# Patient Record
Sex: FEMALE | Race: WHITE
[De-identification: names, ages, dates, MRNs, and addresses within clinical notes are randomized per-mention and may not be internally consistent; named-entity substitution may affect disease eponyms.]

---

## 2017-03-03 ENCOUNTER — PRIOR ORIGINAL RECORDS (OUTPATIENT)
Dept: OTHER | Age: 82
End: 2017-03-03

## 2017-03-03 ENCOUNTER — LAB ENCOUNTER (OUTPATIENT)
Dept: LAB | Facility: HOSPITAL | Age: 82
End: 2017-03-03
Payer: MEDICARE

## 2017-03-03 DIAGNOSIS — I78.0 HEREDITARY HEMORRHAGIC TELANGIECTASIA (HCC): ICD-10-CM

## 2017-03-03 DIAGNOSIS — I50.20 SYSTOLIC HEART FAILURE (HCC): ICD-10-CM

## 2017-03-03 DIAGNOSIS — Z79.899 ENCOUNTER FOR MONITORING DIURETIC THERAPY: ICD-10-CM

## 2017-03-03 DIAGNOSIS — E11.9 DIABETES MELLITUS (HCC): Primary | ICD-10-CM

## 2017-03-03 DIAGNOSIS — D61.818 PANCYTOPENIA (HCC): ICD-10-CM

## 2017-03-03 DIAGNOSIS — R74.8 ELEVATED LIPASE: ICD-10-CM

## 2017-03-03 DIAGNOSIS — Z51.81 ENCOUNTER FOR MONITORING DIURETIC THERAPY: ICD-10-CM

## 2017-03-03 DIAGNOSIS — I48.20 CHRONIC ATRIAL FIBRILLATION (HCC): ICD-10-CM

## 2017-03-03 LAB
ALBUMIN SERPL BCP-MCNC: 4 G/DL (ref 3.5–4.8)
ALBUMIN/GLOB SERPL: 1.4 {RATIO} (ref 1–2)
ALP SERPL-CCNC: 78 U/L (ref 32–100)
ALT SERPL-CCNC: 29 U/L (ref 14–54)
ANION GAP SERPL CALC-SCNC: 6 MMOL/L (ref 0–18)
AST SERPL-CCNC: 37 U/L (ref 15–41)
BASOPHILS # BLD: 0 K/UL (ref 0–0.2)
BASOPHILS NFR BLD: 0 %
BILIRUB SERPL-MCNC: 1 MG/DL (ref 0.3–1.2)
BUN SERPL-MCNC: 29 MG/DL (ref 8–20)
BUN/CREAT SERPL: 35.8 (ref 10–20)
CALCIUM SERPL-MCNC: 9.3 MG/DL (ref 8.5–10.5)
CHLORIDE SERPL-SCNC: 106 MMOL/L (ref 95–110)
CK SERPL-CCNC: 78 U/L (ref 38–234)
CO2 SERPL-SCNC: 30 MMOL/L (ref 22–32)
CREAT SERPL-MCNC: 0.81 MG/DL (ref 0.5–1.5)
EOSINOPHIL # BLD: 0 K/UL (ref 0–0.7)
EOSINOPHIL NFR BLD: 2 %
ERYTHROCYTE [DISTWIDTH] IN BLOOD BY AUTOMATED COUNT: 13.3 % (ref 11–15)
GLOBULIN PLAS-MCNC: 2.8 G/DL (ref 2.5–3.7)
GLUCOSE SERPL-MCNC: 100 MG/DL (ref 70–99)
HCT VFR BLD AUTO: 36.4 % (ref 35–48)
HGB BLD-MCNC: 11.9 G/DL (ref 12–16)
LIPASE SERPL-CCNC: 54 U/L (ref 22–51)
LYMPHOCYTES # BLD: 0.5 K/UL (ref 1–4)
LYMPHOCYTES NFR BLD: 17 %
MAGNESIUM SERPL-MCNC: 2 MG/DL (ref 1.8–2.5)
MCH RBC QN AUTO: 31.9 PG (ref 27–32)
MCHC RBC AUTO-ENTMCNC: 32.6 G/DL (ref 32–37)
MCV RBC AUTO: 97.8 FL (ref 80–100)
MONOCYTES # BLD: 0.2 K/UL (ref 0–1)
MONOCYTES NFR BLD: 8 %
NEUTROPHILS # BLD AUTO: 2.1 K/UL (ref 1.8–7.7)
NEUTROPHILS NFR BLD: 73 %
OSMOLALITY UR CALC.SUM OF ELEC: 300 MOSM/KG (ref 275–295)
PLATELET # BLD AUTO: 108 K/UL (ref 140–400)
PMV BLD AUTO: 9.2 FL (ref 7.4–10.3)
POTASSIUM SERPL-SCNC: 4.3 MMOL/L (ref 3.3–5.1)
PROT SERPL-MCNC: 6.8 G/DL (ref 5.9–8.4)
RBC # BLD AUTO: 3.72 M/UL (ref 3.7–5.4)
SODIUM SERPL-SCNC: 142 MMOL/L (ref 136–144)
WBC # BLD AUTO: 2.9 K/UL (ref 4–11)

## 2017-03-03 PROCEDURE — 82550 ASSAY OF CK (CPK): CPT

## 2017-03-03 PROCEDURE — 83690 ASSAY OF LIPASE: CPT

## 2017-03-03 PROCEDURE — 85025 COMPLETE CBC W/AUTO DIFF WBC: CPT

## 2017-03-03 PROCEDURE — 80053 COMPREHEN METABOLIC PANEL: CPT

## 2017-03-03 PROCEDURE — 83735 ASSAY OF MAGNESIUM: CPT

## 2017-03-03 PROCEDURE — 36415 COLL VENOUS BLD VENIPUNCTURE: CPT

## 2017-03-06 LAB
ALBUMIN: 4 G/DL
ALKALINE PHOSPHATATE(ALK PHOS): 78 IU/L
ALT (SGPT): 29 U/L
AST (SGOT): 37 U/L
BILIRUBIN TOTAL: 1 MG/DL
BUN: 29 MG/DL
CALCIUM: 9.3 MG/DL
CHLORIDE: 106 MEQ/L
CREATININE KINASE: 78 U/L
CREATININE, SERUM: 0.81 MG/DL
GLOBULIN: 2.8 G/DL
GLUCOSE: 100 MG/DL
GLUCOSE: 100 MG/DL
MAGNESIUM: 2 MG/DL
POTASSIUM, SERUM: 4.3 MEQ/L
PROTEIN, TOTAL: 6.8 G/DL
SGOT (AST): 37 IU/L
SGPT (ALT): 29 IU/L
SODIUM: 142 MEQ/L

## 2017-03-16 ENCOUNTER — OFFICE VISIT (OUTPATIENT)
Dept: INTERNAL MEDICINE CLINIC | Facility: CLINIC | Age: 82
End: 2017-03-16

## 2017-03-16 VITALS
WEIGHT: 112 LBS | DIASTOLIC BLOOD PRESSURE: 70 MMHG | HEART RATE: 81 BPM | BODY MASS INDEX: 19.84 KG/M2 | SYSTOLIC BLOOD PRESSURE: 124 MMHG | HEIGHT: 63 IN | RESPIRATION RATE: 17 BRPM | OXYGEN SATURATION: 98 %

## 2017-03-16 DIAGNOSIS — I65.29 STENOSIS OF CAROTID ARTERY, UNSPECIFIED LATERALITY: ICD-10-CM

## 2017-03-16 DIAGNOSIS — N18.9 CHRONIC RENAL INSUFFICIENCY, UNSPECIFIED STAGE: ICD-10-CM

## 2017-03-16 DIAGNOSIS — I34.1 MITRAL VALVE PROLAPSE: ICD-10-CM

## 2017-03-16 DIAGNOSIS — G62.9 NEUROPATHY: ICD-10-CM

## 2017-03-16 DIAGNOSIS — R26.89 MULTIFACTORIAL GAIT DISORDER: ICD-10-CM

## 2017-03-16 DIAGNOSIS — I50.32 CHRONIC DIASTOLIC CONGESTIVE HEART FAILURE, NYHA CLASS 2 (HCC): Primary | ICD-10-CM

## 2017-03-16 DIAGNOSIS — I83.893 VARICOSE VEINS OF BOTH LEGS WITH EDEMA: ICD-10-CM

## 2017-03-16 DIAGNOSIS — D70.9 CHRONIC NEUTROPENIA (HCC): ICD-10-CM

## 2017-03-16 DIAGNOSIS — R41.3 MEMORY DISORDER: ICD-10-CM

## 2017-03-16 DIAGNOSIS — M48.02 CERVICAL SPINAL STENOSIS: ICD-10-CM

## 2017-03-16 DIAGNOSIS — E78.00 HIGH CHOLESTEROL: ICD-10-CM

## 2017-03-16 DIAGNOSIS — M05.79 RHEUMATOID ARTHRITIS INVOLVING MULTIPLE SITES WITH POSITIVE RHEUMATOID FACTOR (HCC): ICD-10-CM

## 2017-03-16 DIAGNOSIS — I48.20 CHRONIC ATRIAL FIBRILLATION (HCC): ICD-10-CM

## 2017-03-16 DIAGNOSIS — M54.16 LUMBAR RADICULOPATHY: ICD-10-CM

## 2017-03-16 LAB
TSH SERPL-ACNC: 1.35 UIU/ML (ref 0.34–5.6)
VIT B12 SERPL-MCNC: 397 PG/ML (ref 181–914)

## 2017-03-16 PROCEDURE — 84443 ASSAY THYROID STIM HORMONE: CPT | Performed by: INTERNAL MEDICINE

## 2017-03-16 PROCEDURE — 99214 OFFICE O/P EST MOD 30 MIN: CPT | Performed by: INTERNAL MEDICINE

## 2017-03-16 PROCEDURE — 82607 VITAMIN B-12: CPT | Performed by: INTERNAL MEDICINE

## 2017-03-16 RX ORDER — SPIRONOLACTONE 25 MG/1
12.5 TABLET ORAL
Qty: 26 TABLET | Refills: 0 | Status: SHIPPED | OUTPATIENT
Start: 2017-03-16 | End: 2017-09-11

## 2017-03-16 NOTE — PROGRESS NOTES
Kenji Pimentel is a 80year old female. Patient presents with:  Test Results: Pt presents to clinic to discuss recent labs. Medication Follow-Up: Would also like to discuss and  go over meds.        HPI:       Patient comes to office accompanied by brooks OP) Place 1 drop into both eyes 2 (two) times daily. Disp:  Rfl:    Vitamin B-12 (VITAMIN B12) 1000 MCG Oral Tab Take 1,000 mcg by mouth daily. Disp:  Rfl:    Multiple Vitamins-Minerals (MULTIVITAL) Oral Tab Take 1 tablet by mouth daily.  Disp:  Rfl:    asp Lasix  MUSCULOSKELETAL: Back pain radiating to right leg  SKIN: Itchiness of skin improved  NEUROLOGICAL: denies focal deficits  HEME: Planes of ecchymoses upper extremities  PSYCH: anxiety    Physical Exam:   03/16/17  1238   BP: 124/70   Pulse: 81   Resp 12.0-16.0 g/dL   HCT 36.4 35.0-48.0 %   MCV 97.8 80.0-100.0 fL   MCH 31.9 27.0-32.0 pg   MCHC 32.6 32.0-37.0 g/dl   RDW 13.3 11.0-15.0 %    (L) 140-400 K/UL   MPV 9.2 7.4-10.3 fL   Neutrophil % 73 %   Lymphocyte % 17 %   Monocyte % 8 %   Eosinophil stenosis  Plan: This possibly affects her gait also    (I65.29) Stenosis of carotid artery, unspecified laterality  Plan: Followed by cardiology    (D70.9) Chronic neutropenia ([facility] [address]  Plan: WBC count slightly decreased from prior. To follow in the future.

## 2017-04-12 ENCOUNTER — MYAURORA ACCOUNT LINK (OUTPATIENT)
Dept: OTHER | Age: 82
End: 2017-04-12

## 2017-04-12 ENCOUNTER — PRIOR ORIGINAL RECORDS (OUTPATIENT)
Dept: OTHER | Age: 82
End: 2017-04-12

## 2017-04-12 LAB
HEMATOCRIT: 36.4 %
HEMOGLOBIN: 11.9 G/DL
PLATELETS: 108 K/UL
RED BLOOD COUNT: 3.72 X 10-6/U
WHITE BLOOD COUNT: 2.9 X 10-3/U

## 2017-04-25 ENCOUNTER — OFFICE VISIT (OUTPATIENT)
Dept: FAMILY MEDICINE CLINIC | Facility: CLINIC | Age: 82
End: 2017-04-25

## 2017-04-25 VITALS
WEIGHT: 110 LBS | SYSTOLIC BLOOD PRESSURE: 118 MMHG | DIASTOLIC BLOOD PRESSURE: 78 MMHG | HEART RATE: 83 BPM | BODY MASS INDEX: 20.24 KG/M2 | OXYGEN SATURATION: 97 % | RESPIRATION RATE: 20 BRPM | HEIGHT: 62 IN | TEMPERATURE: 98 F

## 2017-04-25 DIAGNOSIS — J40 BRONCHITIS: Primary | ICD-10-CM

## 2017-04-25 PROCEDURE — 99213 OFFICE O/P EST LOW 20 MIN: CPT | Performed by: NURSE PRACTITIONER

## 2017-04-25 RX ORDER — ALBUTEROL SULFATE 90 UG/1
AEROSOL, METERED RESPIRATORY (INHALATION)
Qty: 1 INHALER | Refills: 0 | Status: SHIPPED | OUTPATIENT
Start: 2017-04-25 | End: 2018-09-26 | Stop reason: ALTCHOICE

## 2017-04-25 NOTE — PROGRESS NOTES
CHIEF COMPLAINT:   Patient presents with:  Nasal Congestion  Cough        HPI:   Fernando Bazzi is a 80year old female who presents for cough for  2  days. Cough started gradually and is described as dry and hacking. Patient no history of bronchitis. daily. Disp:  Rfl:       Past Medical History   Diagnosis Date   • Osteoarthrosis, unspecified whether generalized or localized, unspecified site    • Atrial fibrillation ([facility]    • CHF (congestive heart failure) ([facility]    • Depression    • Anxiety state, u female who presents with: Cough    ASSESSMENT:  Bronchitis  (primary encounter diagnosis)    PLAN:  Meds as below. Comfort Care as listed in Patient Instructions.       Meds & Refills for this Visit:  Signed Prescriptions Disp Refills    Albuterol Sulfate test for bronchitis? — People do not usually need a test. But your doctor or nurse might do a test, such as a chest x-ray, if the cause of your cough isn’t clear.    How is bronchitis treated? — Doctors do not usually treat bronchitis with antibiotic medici

## 2017-04-25 NOTE — PATIENT INSTRUCTIONS
What is bronchitis? — Bronchitis is an infection that causes a cough. It happens when the tubes that carry air into the lungs, called the “bronchi,” get infected. Usually, bronchitis happens when a person gets a cold or the flu.  The viruses that cause th pain-relieving medicine   Taking over-the-counter cough and cold medicines   Breathing in warm, moist air, such as in the shower, over a kettle, or from a humidifier  How can I keep from getting bronchitis again? — You can reduce your chance of getting bro

## 2017-05-20 ENCOUNTER — OFFICE VISIT (OUTPATIENT)
Dept: INTERNAL MEDICINE CLINIC | Facility: CLINIC | Age: 82
End: 2017-05-20

## 2017-05-20 VITALS
RESPIRATION RATE: 14 BRPM | WEIGHT: 114 LBS | BODY MASS INDEX: 20.2 KG/M2 | HEART RATE: 74 BPM | DIASTOLIC BLOOD PRESSURE: 68 MMHG | SYSTOLIC BLOOD PRESSURE: 120 MMHG | HEIGHT: 63 IN | TEMPERATURE: 98 F | OXYGEN SATURATION: 98 %
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
## 2017-05-20 DIAGNOSIS — R41.3 MEMORY DISORDER: ICD-10-CM

## 2017-05-20 DIAGNOSIS — S51.809A: Primary | ICD-10-CM

## 2017-05-20 DIAGNOSIS — I48.20 CHRONIC ATRIAL FIBRILLATION (HCC): ICD-10-CM

## 2017-05-20 PROCEDURE — 90714 TD VACC NO PRESV 7 YRS+ IM: CPT | Performed by: INTERNAL MEDICINE

## 2017-05-20 PROCEDURE — 99214 OFFICE O/P EST MOD 30 MIN: CPT | Performed by: INTERNAL MEDICINE

## 2017-05-20 PROCEDURE — 90471 IMMUNIZATION ADMIN: CPT | Performed by: INTERNAL MEDICINE

## 2017-05-20 RX ORDER — DONEPEZIL HYDROCHLORIDE 5 MG/1
5 TABLET, FILM COATED ORAL EVERY OTHER DAY
Qty: 30 TABLET | Refills: 0 | Status: SHIPPED | OUTPATIENT
Start: 2017-05-20 | End: 2017-05-30

## 2017-05-20 RX ORDER — CEPHALEXIN 250 MG/5ML
250 POWDER, FOR SUSPENSION ORAL 3 TIMES DAILY
Qty: 150 ML | Refills: 0 | Status: SHIPPED | OUTPATIENT
Start: 2017-05-20 | End: 2017-05-20 | Stop reason: CLARIF

## 2017-05-20 RX ORDER — CEPHALEXIN 250 MG/1
250 CAPSULE ORAL 3 TIMES DAILY
Qty: 15 CAPSULE | Refills: 0 | Status: SHIPPED | OUTPATIENT
Start: 2017-05-20 | End: 2017-10-21 | Stop reason: ALTCHOICE

## 2017-05-20 NOTE — PROGRESS NOTES
Hoda Bazzi is a 80year old female. Patient presents with:   Follow - Up: pt presents to clinic for follow up  Medication Request: request refills       HPI:         Rosalio Betters outside with cane instead of walker 13 days ago; tripped, fell on left side mouth daily. Disp:  Rfl:    Multiple Vitamins-Minerals (MULTIVITAL) Oral Tab Take 1 tablet by mouth daily. Disp:  Rfl:    aspirin 81 MG Oral Tab Take 81 mg by mouth daily.  Disp:  Rfl:    Metoprolol Succinate ER (TOPROL XL) 25 MG Oral Tablet 24 Hr Take 25 m bruising or bleeding  PSYCH: denies depression     Physical Exam:   05/20/17  1128   BP: 120/68   Pulse: 74   Temp: 97.5 °F (36.4 °C)   TempSrc: Oral   Height: 63\"   Weight: 114 lb   SpO2: 98%     Body mass index is 20.2 kg/(m^2).   Patient is alert, Berniece Nay encounter  Plan: Patient had PT in past and did not use walker when going outside of house, tripped over sidewalk, not paying attention with where walking.  Advised to go out or take shower when caretaker present.    (R41.3) Memory disorder  Plan: Donepezil

## 2017-05-20 NOTE — PROGRESS NOTES
TD injection/vaccine  0.5 mL administered IM  to the LT Deltoid. Vaccine/injection ordered by physician and documented within chart. Per physician able to administer vaccine/injection. Pt tolerated well. VIS provided and pt had no further questions.   ve

## 2017-05-30 DIAGNOSIS — R41.3 MEMORY DISORDER: Primary | ICD-10-CM

## 2017-05-30 RX ORDER — DONEPEZIL HYDROCHLORIDE 5 MG/1
5 TABLET, FILM COATED ORAL EVERY OTHER DAY
Qty: 30 TABLET | Refills: 0 | OUTPATIENT
Start: 2017-05-30 | End: 2017-07-27

## 2017-06-21 DIAGNOSIS — M54.17 L-S RADICULOPATHY: Primary | ICD-10-CM

## 2017-06-21 RX ORDER — IPRATROPIUM BROMIDE 21 UG/1
2 SPRAY, METERED NASAL 2 TIMES DAILY
Qty: 1 BOTTLE | Refills: 1 | Status: ON HOLD | OUTPATIENT
Start: 2017-06-21 | End: 2018-09-26

## 2017-07-27 DIAGNOSIS — R41.3 MEMORY DISORDER: ICD-10-CM

## 2017-07-29 RX ORDER — DONEPEZIL HYDROCHLORIDE 5 MG/1
TABLET, FILM COATED ORAL
Qty: 30 TABLET | Refills: 0 | Status: SHIPPED | OUTPATIENT
Start: 2017-07-29 | End: 2017-08-29

## 2017-08-29 DIAGNOSIS — R41.3 MEMORY DISORDER: ICD-10-CM

## 2017-08-30 RX ORDER — DONEPEZIL HYDROCHLORIDE 5 MG/1
TABLET, FILM COATED ORAL
Qty: 30 TABLET | Refills: 0 | Status: SHIPPED | OUTPATIENT
Start: 2017-08-30 | End: 2017-10-05

## 2017-08-31 ENCOUNTER — TELEPHONE (OUTPATIENT)
Dept: INTERNAL MEDICINE CLINIC | Facility: CLINIC | Age: 82
End: 2017-08-31

## 2017-09-11 RX ORDER — SPIRONOLACTONE 25 MG/1
12.5 TABLET ORAL
Qty: 26 TABLET | Refills: 0 | Status: SHIPPED | OUTPATIENT
Start: 2017-09-11 | End: 2017-11-03

## 2017-09-11 NOTE — TELEPHONE ENCOUNTER
LOV 5/20/2017, Patient was instructed to follow up with Dr Ezequiel Morataya 5 days after this appointment.  Patient is due for Medicare visits, Please advise

## 2017-10-05 DIAGNOSIS — R41.3 MEMORY DISORDER: ICD-10-CM

## 2017-10-06 RX ORDER — DONEPEZIL HYDROCHLORIDE 5 MG/1
TABLET, FILM COATED ORAL
Qty: 30 TABLET | Refills: 0 | Status: SHIPPED | OUTPATIENT
Start: 2017-10-06 | End: 2017-11-08

## 2017-10-21 ENCOUNTER — LAB ENCOUNTER (OUTPATIENT)
Dept: LAB | Facility: HOSPITAL | Age: 82
End: 2017-10-21
Attending: INTERNAL MEDICINE
Payer: MEDICARE

## 2017-10-21 ENCOUNTER — OFFICE VISIT (OUTPATIENT)
Dept: INTERNAL MEDICINE CLINIC | Facility: CLINIC | Age: 82
End: 2017-10-21

## 2017-10-21 VITALS
HEART RATE: 81 BPM | TEMPERATURE: 97 F | BODY MASS INDEX: 23.68 KG/M2 | WEIGHT: 132 LBS | OXYGEN SATURATION: 98 % | HEIGHT: 62.5 IN | SYSTOLIC BLOOD PRESSURE: 130 MMHG | DIASTOLIC BLOOD PRESSURE: 60 MMHG

## 2017-10-21 DIAGNOSIS — R41.3 MEMORY DISORDER: ICD-10-CM

## 2017-10-21 DIAGNOSIS — I48.20 CHRONIC ATRIAL FIBRILLATION (HCC): ICD-10-CM

## 2017-10-21 DIAGNOSIS — Z12.39 BREAST CANCER SCREENING: ICD-10-CM

## 2017-10-21 DIAGNOSIS — T50.2X5S DIURETICS CAUSING ADVERSE EFFECT IN THERAPEUTIC USE, SEQUELA: ICD-10-CM

## 2017-10-21 DIAGNOSIS — I10 ESSENTIAL HYPERTENSION: ICD-10-CM

## 2017-10-21 DIAGNOSIS — M72.2 PLANTAR FASCIITIS, RIGHT: ICD-10-CM

## 2017-10-21 DIAGNOSIS — R06.00 DYSPNEA ON EXERTION: ICD-10-CM

## 2017-10-21 DIAGNOSIS — I50.31 ACUTE DIASTOLIC CONGESTIVE HEART FAILURE (HCC): Primary | ICD-10-CM

## 2017-10-21 DIAGNOSIS — D50.0 IRON DEFICIENCY ANEMIA DUE TO CHRONIC BLOOD LOSS: ICD-10-CM

## 2017-10-21 DIAGNOSIS — R92.2 DENSE BREAST TISSUE: ICD-10-CM

## 2017-10-21 DIAGNOSIS — I50.31 ACUTE DIASTOLIC CONGESTIVE HEART FAILURE (HCC): ICD-10-CM

## 2017-10-21 DIAGNOSIS — R19.7 DIARRHEA OF PRESUMED INFECTIOUS ORIGIN: ICD-10-CM

## 2017-10-21 PROCEDURE — 83540 ASSAY OF IRON: CPT

## 2017-10-21 PROCEDURE — 36415 COLL VENOUS BLD VENIPUNCTURE: CPT

## 2017-10-21 PROCEDURE — 80061 LIPID PANEL: CPT

## 2017-10-21 PROCEDURE — 85025 COMPLETE CBC W/AUTO DIFF WBC: CPT

## 2017-10-21 PROCEDURE — 83880 ASSAY OF NATRIURETIC PEPTIDE: CPT

## 2017-10-21 PROCEDURE — 99213 OFFICE O/P EST LOW 20 MIN: CPT | Performed by: INTERNAL MEDICINE

## 2017-10-21 PROCEDURE — 80053 COMPREHEN METABOLIC PANEL: CPT

## 2017-10-21 PROCEDURE — 84466 ASSAY OF TRANSFERRIN: CPT

## 2017-10-21 PROCEDURE — G0439 PPPS, SUBSEQ VISIT: HCPCS | Performed by: INTERNAL MEDICINE

## 2017-10-21 PROCEDURE — 81015 MICROSCOPIC EXAM OF URINE: CPT

## 2017-10-21 PROCEDURE — 83735 ASSAY OF MAGNESIUM: CPT

## 2017-10-21 PROCEDURE — 84443 ASSAY THYROID STIM HORMONE: CPT

## 2017-10-21 NOTE — PROGRESS NOTES
HPI:   Harry Larson is a 80year old female who presents for a Medicare Annual Wellness visit.     Patient Active Problem List:     CHF (congestive heart failure) (HCC)     Neuropathy     A-fib (HCC)     Depression     Anemia     Osteoarthritis     A Yes    Difficulty walking?: Yes    Difficulty dressing or bathing?: No    Problems with daily activities? : No    Memory Problems?: Yes      Fall/Risk Assessment          Have you fallen in the last 12 months?: 0-No                                        F MIST) 0.65 % Nasal Solution 1 spray by Nasal route 3 (three) times daily. Disp:  Rfl:    Multiple Vitamins-Minerals (MULTIVITAL) Oral Tab Take 1 tablet by mouth daily. Disp:  Rfl:    aspirin 81 MG Oral Tab Take 81 mg by mouth daily.  Disp:  Rfl:    Metoprol denies headaches  PSYCHE: denies depression or anxiety  HEMATOLOGIC: denies hx of anemia  ENDOCRINE: denies thyroid history  ALL/ASTHMA: denies hx of allergy or asthma    EXAM:   /60 (BP Location: Right arm, Patient Position: Sitting, Cuff Size: adul encounter diagnosis)  Plan: LIPID PANEL, CBC WITH DIFFERENTIAL WITH         PLATELET, COMP METABOLIC PANEL (14)        Increase diuretics as instructed.   Follow-up approximately 10 days    (R06.09) Dyspnea on exertion  Plan: BNP (B TYPE NATRIUERTIC PEPTIDE for this patient. Update Health Maintenance if applicable    Flex Sigmoidoscopy Screen every 10 years No results found for this or any previous visit. No flowsheet data found.      Fecal Occult Blood Annually No results found for: FOB No flowsheet data foun 04/04/2015 41 (H)    No flowsheet data found. Drug Serum Conc  Annually No results found for: DIGOXIN, DIG, VALP No flowsheet data found. Diabetes      HgbA1C  Annually No results found for: A1C No flowsheet data found.     Creat/alb ratio  Annuall

## 2017-10-21 NOTE — PATIENT INSTRUCTIONS
Take 2 furosemide daily times 2 days.     Then take 2 Lasix  Monday, Wed., Friday and Sundays, with 1 Lasix other days  Take 1/2 spironolactone every evening

## 2017-10-27 ENCOUNTER — APPOINTMENT (OUTPATIENT)
Dept: LAB | Facility: HOSPITAL | Age: 82
End: 2017-10-27
Attending: INTERNAL MEDICINE
Payer: MEDICARE

## 2017-10-27 PROCEDURE — 87507 IADNA-DNA/RNA PROBE TQ 12-25: CPT | Performed by: INTERNAL MEDICINE

## 2017-11-02 ENCOUNTER — OFFICE VISIT (OUTPATIENT)
Dept: INTERNAL MEDICINE CLINIC | Facility: CLINIC | Age: 82
End: 2017-11-02

## 2017-11-02 VITALS
OXYGEN SATURATION: 98 % | WEIGHT: 120 LBS | SYSTOLIC BLOOD PRESSURE: 130 MMHG | BODY MASS INDEX: 22 KG/M2 | HEART RATE: 79 BPM | DIASTOLIC BLOOD PRESSURE: 80 MMHG | TEMPERATURE: 98 F

## 2017-11-02 DIAGNOSIS — M05.79 RHEUMATOID ARTHRITIS INVOLVING MULTIPLE SITES WITH POSITIVE RHEUMATOID FACTOR (HCC): ICD-10-CM

## 2017-11-02 DIAGNOSIS — K52.9 CHRONIC DIARRHEA: ICD-10-CM

## 2017-11-02 DIAGNOSIS — M48.02 SPINAL STENOSIS IN CERVICAL REGION: ICD-10-CM

## 2017-11-02 DIAGNOSIS — I48.0 PAROXYSMAL ATRIAL FIBRILLATION (HCC): ICD-10-CM

## 2017-11-02 DIAGNOSIS — D50.0 IRON DEFICIENCY ANEMIA DUE TO CHRONIC BLOOD LOSS: ICD-10-CM

## 2017-11-02 DIAGNOSIS — I50.32 CHRONIC DIASTOLIC CONGESTIVE HEART FAILURE (HCC): Primary | ICD-10-CM

## 2017-11-02 PROCEDURE — 80048 BASIC METABOLIC PNL TOTAL CA: CPT | Performed by: INTERNAL MEDICINE

## 2017-11-02 PROCEDURE — 99213 OFFICE O/P EST LOW 20 MIN: CPT | Performed by: INTERNAL MEDICINE

## 2017-11-02 PROCEDURE — 36415 COLL VENOUS BLD VENIPUNCTURE: CPT | Performed by: INTERNAL MEDICINE

## 2017-11-02 PROCEDURE — 83735 ASSAY OF MAGNESIUM: CPT | Performed by: INTERNAL MEDICINE

## 2017-11-02 NOTE — PROGRESS NOTES
Pt presented to clinic today for blood draw. Per physician able to draw orders. Orders  documented within chart. Pt tolerated lab draw well.  verified.   Orders drawn include: marty, mg  Site of draw: rt she Sullivan CMA

## 2017-11-02 NOTE — PATIENT INSTRUCTIONS
Decrease Lasix to 2 on Mon., Wed., and Saturday and 1 Lasix other days     Decrease spironolactone to 1/2  four nights weekly ( Tues. , Thurs. , Friday and Sundays)

## 2017-11-02 NOTE — PROGRESS NOTES
Hoda Bazzi is a 80year old female. Patient presents with: Follow - Up: pt brought to clinic by son in for 1 week follow up on leg swelling.        HPI:         Since son states she was short of breath on walking at last visit, and no shortness o RA   • Atrial fibrillation (HCC)    • CHF (congestive heart failure) (HCC)    • Congestive heart disease (HCC)    • Depression    • Esophageal reflux    • Essential hypertension    • High blood pressure    • High cholesterol    • Hyperlipidemia    • Lum palpation. No masses. Extremities-edema much improved, though still trace to 1+ pitting edema in bilateral lower extremities even to thighs.   Patient has support hose on lower extremities    Objectives:    Results for orders placed or performed in visit Mon., Wed., and Saturday and 1 Lasix other days     Decrease spironolactone to 1/2  four nights weekly ( Tues. , Thurs. , Friday and Sundays)      Return in about 1 month (around 12/2/2017).         Shelley Arzate MD

## 2017-11-03 DIAGNOSIS — I10 ESSENTIAL HYPERTENSION: ICD-10-CM

## 2017-11-03 RX ORDER — SPIRONOLACTONE 25 MG/1
TABLET ORAL
Qty: 26 TABLET | Refills: 0 | Status: ON HOLD | OUTPATIENT
Start: 2017-11-03 | End: 2018-09-29

## 2017-11-03 RX ORDER — FUROSEMIDE 20 MG/1
TABLET ORAL
Qty: 60 TABLET | Refills: 1 | Status: SHIPPED | OUTPATIENT
Start: 2017-11-03 | End: 2018-01-22

## 2017-11-04 DIAGNOSIS — I10 ESSENTIAL HYPERTENSION: ICD-10-CM

## 2017-11-08 DIAGNOSIS — R41.3 MEMORY DISORDER: ICD-10-CM

## 2017-11-10 RX ORDER — DONEPEZIL HYDROCHLORIDE 5 MG/1
TABLET, FILM COATED ORAL
Qty: 90 TABLET | Refills: 1 | Status: SHIPPED | OUTPATIENT
Start: 2017-11-10 | End: 2018-04-29

## 2018-01-01 ENCOUNTER — OFFICE VISIT (OUTPATIENT)
Dept: INTERNAL MEDICINE CLINIC | Facility: CLINIC | Age: 83
End: 2018-01-01
Payer: MEDICARE

## 2018-01-01 ENCOUNTER — TELEPHONE (OUTPATIENT)
Dept: INTERNAL MEDICINE CLINIC | Facility: CLINIC | Age: 83
End: 2018-01-01

## 2018-01-01 VITALS
HEIGHT: 61.5 IN | DIASTOLIC BLOOD PRESSURE: 58 MMHG | RESPIRATION RATE: 15 BRPM | WEIGHT: 110 LBS | TEMPERATURE: 97 F | OXYGEN SATURATION: 98 % | HEART RATE: 82 BPM | SYSTOLIC BLOOD PRESSURE: 110 MMHG | BODY MASS INDEX: 20.5 KG/M2

## 2018-01-01 DIAGNOSIS — M05.79 RHEUMATOID ARTHRITIS INVOLVING MULTIPLE SITES WITH POSITIVE RHEUMATOID FACTOR (HCC): ICD-10-CM

## 2018-01-01 DIAGNOSIS — I10 ESSENTIAL HYPERTENSION: ICD-10-CM

## 2018-01-01 DIAGNOSIS — Z79.899 ENCOUNTER FOR MONITORING DIURETIC THERAPY: ICD-10-CM

## 2018-01-01 DIAGNOSIS — Z51.81 ENCOUNTER FOR MONITORING DIURETIC THERAPY: ICD-10-CM

## 2018-01-01 DIAGNOSIS — I50.32 CHRONIC DIASTOLIC CONGESTIVE HEART FAILURE (HCC): Primary | ICD-10-CM

## 2018-01-01 DIAGNOSIS — I48.0 PAROXYSMAL ATRIAL FIBRILLATION (HCC): ICD-10-CM

## 2018-01-01 DIAGNOSIS — F02.80 DEMENTIA DUE TO ALZHEIMER'S DISEASE (HCC): ICD-10-CM

## 2018-01-01 DIAGNOSIS — G30.9 DEMENTIA DUE TO ALZHEIMER'S DISEASE (HCC): ICD-10-CM

## 2018-01-01 PROCEDURE — 80048 BASIC METABOLIC PNL TOTAL CA: CPT | Performed by: INTERNAL MEDICINE

## 2018-01-01 PROCEDURE — 99213 OFFICE O/P EST LOW 20 MIN: CPT | Performed by: INTERNAL MEDICINE

## 2018-01-01 PROCEDURE — 83735 ASSAY OF MAGNESIUM: CPT | Performed by: INTERNAL MEDICINE

## 2018-01-16 ENCOUNTER — OFFICE VISIT (OUTPATIENT)
Dept: GASTROENTEROLOGY | Facility: CLINIC | Age: 83
End: 2018-01-16

## 2018-01-16 VITALS
HEIGHT: 63 IN | BODY MASS INDEX: 20.02 KG/M2 | SYSTOLIC BLOOD PRESSURE: 135 MMHG | HEART RATE: 61 BPM | WEIGHT: 113 LBS | DIASTOLIC BLOOD PRESSURE: 76 MMHG

## 2018-01-16 DIAGNOSIS — R13.10 DYSPHAGIA, UNSPECIFIED TYPE: ICD-10-CM

## 2018-01-16 DIAGNOSIS — R19.7 DIARRHEA, UNSPECIFIED TYPE: Primary | ICD-10-CM

## 2018-01-16 PROCEDURE — G0463 HOSPITAL OUTPT CLINIC VISIT: HCPCS | Performed by: INTERNAL MEDICINE

## 2018-01-16 PROCEDURE — 99203 OFFICE O/P NEW LOW 30 MIN: CPT | Performed by: INTERNAL MEDICINE

## 2018-01-16 RX ORDER — MONTELUKAST SODIUM 4 MG/1
1 TABLET, CHEWABLE ORAL NIGHTLY
Qty: 90 TABLET | Refills: 0 | Status: SHIPPED | OUTPATIENT
Start: 2018-01-16 | End: 2018-04-27

## 2018-01-16 RX ORDER — MONTELUKAST SODIUM 4 MG/1
1 TABLET, CHEWABLE ORAL NIGHTLY
Qty: 90 TABLET | Refills: 0 | Status: SHIPPED | OUTPATIENT
Start: 2018-01-16 | End: 2018-01-16

## 2018-01-16 NOTE — H&P
[facility], [facility] - Gastroenterology                                                                                                  Clinic History and Physical radiculopathy    • Osteoarthrosis, unspecified whether generalized or localized, unspecified site    • Paroxysmal atrial fibrillation [facility])    • Radiculopathy    • Spinal stenosis    • Spinal stenosis in cervical region       Past Surgical History:  No anders daily. Disp:  Rfl:      Allergies:  Not on File    ROS:   all 10 systems were reviewed and were negative except as noted in the HPI    PHYSICAL EXAM:   Blood pressure 135/76, pulse 61, height 5' 3\" (1.6 m), weight 113 lb (51.3 kg).     Xiomy Aponte insufficiency. Her son also asks about dysphagia, which sounds more oropharyngeal in origin and could be related to her cognitive impairment.  Discussed again endoscopic evaluation including the potential for malignancy though less likely though they wou

## 2018-01-16 NOTE — PATIENT INSTRUCTIONS
1. Start colestid/colestipol one pill each night - take it 2-3 hours away from other pills    2. Let me know how the diarrhea is after taking this in 2-3 weeks    3.  Get your stool test completed to look for pancreatic insufficiency as a cause for the diar

## 2018-01-22 ENCOUNTER — LAB ENCOUNTER (OUTPATIENT)
Dept: LAB | Facility: HOSPITAL | Age: 83
End: 2018-01-22
Attending: INTERNAL MEDICINE
Payer: MEDICARE

## 2018-01-22 DIAGNOSIS — R19.7 DIARRHEA: Primary | ICD-10-CM

## 2018-01-22 DIAGNOSIS — I10 ESSENTIAL HYPERTENSION: ICD-10-CM

## 2018-01-22 PROCEDURE — 82656 EL-1 FECAL QUAL/SEMIQ: CPT

## 2018-01-22 RX ORDER — FUROSEMIDE 20 MG/1
TABLET ORAL
Qty: 60 TABLET | Refills: 1 | Status: SHIPPED | OUTPATIENT
Start: 2018-01-22 | End: 2018-04-16

## 2018-01-25 ENCOUNTER — TELEPHONE (OUTPATIENT)
Dept: GASTROENTEROLOGY | Facility: CLINIC | Age: 83
End: 2018-01-25

## 2018-01-25 LAB — PANCREATIC ELASTASE , FECAL: 498 UG/G

## 2018-01-30 RX ORDER — SPIRONOLACTONE 25 MG/1
TABLET ORAL
Qty: 12 TABLET | Refills: 0 | Status: SHIPPED | OUTPATIENT
Start: 2018-01-30 | End: 2018-02-28 | Stop reason: ALTCHOICE

## 2018-01-31 ENCOUNTER — TELEPHONE (OUTPATIENT)
Dept: INTERNAL MEDICINE CLINIC | Facility: CLINIC | Age: 83
End: 2018-01-31

## 2018-02-28 ENCOUNTER — TELEPHONE (OUTPATIENT)
Dept: INTERNAL MEDICINE CLINIC | Facility: CLINIC | Age: 83
End: 2018-02-28

## 2018-02-28 ENCOUNTER — OFFICE VISIT (OUTPATIENT)
Dept: INTERNAL MEDICINE CLINIC | Facility: CLINIC | Age: 83
End: 2018-02-28

## 2018-02-28 VITALS
BODY MASS INDEX: 20 KG/M2 | SYSTOLIC BLOOD PRESSURE: 102 MMHG | WEIGHT: 114 LBS | HEART RATE: 95 BPM | DIASTOLIC BLOOD PRESSURE: 50 MMHG | RESPIRATION RATE: 15 BRPM | OXYGEN SATURATION: 97 % | TEMPERATURE: 101 F

## 2018-02-28 DIAGNOSIS — D64.9 ANEMIA, UNSPECIFIED TYPE: Chronic | ICD-10-CM

## 2018-02-28 DIAGNOSIS — M05.79 RHEUMATOID ARTHRITIS INVOLVING MULTIPLE SITES WITH POSITIVE RHEUMATOID FACTOR (HCC): ICD-10-CM

## 2018-02-28 DIAGNOSIS — Z79.899 ENCOUNTER FOR MONITORING DIURETIC THERAPY: ICD-10-CM

## 2018-02-28 DIAGNOSIS — I10 ESSENTIAL HYPERTENSION: ICD-10-CM

## 2018-02-28 DIAGNOSIS — I50.32 CHRONIC DIASTOLIC CONGESTIVE HEART FAILURE (HCC): ICD-10-CM

## 2018-02-28 DIAGNOSIS — R50.9 FEVER, UNSPECIFIED FEVER CAUSE: Primary | ICD-10-CM

## 2018-02-28 DIAGNOSIS — R41.3 MEMORY DISORDER: ICD-10-CM

## 2018-02-28 DIAGNOSIS — D50.0 IRON DEFICIENCY ANEMIA DUE TO CHRONIC BLOOD LOSS: ICD-10-CM

## 2018-02-28 DIAGNOSIS — I48.0 PAROXYSMAL ATRIAL FIBRILLATION (HCC): ICD-10-CM

## 2018-02-28 DIAGNOSIS — Z51.81 ENCOUNTER FOR MONITORING DIURETIC THERAPY: ICD-10-CM

## 2018-02-28 LAB
ANION GAP SERPL CALC-SCNC: 9 MMOL/L (ref 0–18)
BASOPHILS # BLD: 0 K/UL (ref 0–0.2)
BASOPHILS NFR BLD: 0 %
BUN SERPL-MCNC: 23 MG/DL (ref 8–20)
BUN/CREAT SERPL: 27.4 (ref 10–20)
CALCIUM SERPL-MCNC: 8.8 MG/DL (ref 8.5–10.5)
CHLORIDE SERPL-SCNC: 99 MMOL/L (ref 95–110)
CO2 SERPL-SCNC: 27 MMOL/L (ref 22–32)
CREAT SERPL-MCNC: 0.84 MG/DL (ref 0.5–1.5)
EOSINOPHIL # BLD: 0 K/UL (ref 0–0.7)
EOSINOPHIL NFR BLD: 0 %
ERYTHROCYTE [DISTWIDTH] IN BLOOD BY AUTOMATED COUNT: 14.2 % (ref 11–15)
FLUAV + FLUBV RNA SPEC NAA+PROBE: NEGATIVE
GLUCOSE (URINE DIPSTICK): NEGATIVE MG/DL
GLUCOSE SERPL-MCNC: 128 MG/DL (ref 70–99)
HCT VFR BLD AUTO: 35.2 % (ref 35–48)
HGB BLD-MCNC: 11.7 G/DL (ref 12–16)
LYMPHOCYTES # BLD: 0.3 K/UL (ref 1–4)
LYMPHOCYTES NFR BLD: 3 %
MAGNESIUM SERPL-MCNC: 2 MG/DL (ref 1.8–2.5)
MCH RBC QN AUTO: 33.1 PG (ref 27–32)
MCHC RBC AUTO-ENTMCNC: 33.3 G/DL (ref 32–37)
MCV RBC AUTO: 99.4 FL (ref 80–100)
MONOCYTES # BLD: 0.9 K/UL (ref 0–1)
MONOCYTES NFR BLD: 10 %
NEUTROPHILS # BLD AUTO: 7.6 K/UL (ref 1.8–7.7)
NEUTROPHILS NFR BLD: 86 %
NITRITE, URINE: NEGATIVE
NITRITE, URINE: NEGATIVE
OSMOLALITY UR CALC.SUM OF ELEC: 285 MOSM/KG (ref 275–295)
PH, URINE: 5.5 (ref 4.5–8)
PH, URINE: 5.5 (ref 4.5–8)
PLATELET # BLD AUTO: 117 K/UL (ref 140–400)
PMV BLD AUTO: 10.6 FL (ref 7.4–10.3)
POTASSIUM SERPL-SCNC: 4.2 MMOL/L (ref 3.3–5.1)
PROTEIN (URINE DIPSTICK): 100 MG/DL
PROTEIN (URINE DIPSTICK): 100 MG/DL
RBC # BLD AUTO: 3.54 M/UL (ref 3.7–5.4)
SODIUM SERPL-SCNC: 135 MMOL/L (ref 136–144)
SPECIFIC GRAVITY: 1.02 (ref 1–1.03)
SPECIFIC GRAVITY: 1.02 (ref 1–1.03)
UROBILINOGEN,SEMI-QN: 1 MG/DL (ref 0–1.9)
UROBILINOGEN,SEMI-QN: 1 MG/DL (ref 0–1.9)
WBC # BLD AUTO: 8.8 K/UL (ref 4–11)

## 2018-02-28 PROCEDURE — 80048 BASIC METABOLIC PNL TOTAL CA: CPT | Performed by: INTERNAL MEDICINE

## 2018-02-28 PROCEDURE — 36415 COLL VENOUS BLD VENIPUNCTURE: CPT | Performed by: INTERNAL MEDICINE

## 2018-02-28 PROCEDURE — 83735 ASSAY OF MAGNESIUM: CPT | Performed by: INTERNAL MEDICINE

## 2018-02-28 PROCEDURE — 85025 COMPLETE CBC W/AUTO DIFF WBC: CPT | Performed by: INTERNAL MEDICINE

## 2018-02-28 PROCEDURE — 87631 RESP VIRUS 3-5 TARGETS: CPT | Performed by: INTERNAL MEDICINE

## 2018-02-28 PROCEDURE — 87086 URINE CULTURE/COLONY COUNT: CPT | Performed by: INTERNAL MEDICINE

## 2018-02-28 PROCEDURE — 99213 OFFICE O/P EST LOW 20 MIN: CPT | Performed by: INTERNAL MEDICINE

## 2018-02-28 RX ORDER — LEVOFLOXACIN 500 MG/1
500 TABLET, FILM COATED ORAL DAILY
Qty: 8 TABLET | Refills: 0 | Status: SHIPPED | OUTPATIENT
Start: 2018-02-28 | End: 2018-02-28

## 2018-02-28 RX ORDER — LEVOFLOXACIN 500 MG/1
500 TABLET, FILM COATED ORAL DAILY
Qty: 8 TABLET | Refills: 0 | Status: SHIPPED | OUTPATIENT
Start: 2018-02-28 | End: 2018-03-08

## 2018-02-28 NOTE — PROGRESS NOTES
Reymundo Najera is a 80year old female. Patient presents with:  Fever: pt presents to clinic c/o fever, body aches, tiredness x2 days   History from patient and her son. HPI:       2 days ago, sleeping most of day.   Temp 101.1     Always awakens wit failure) ([facility]    • Congestive heart disease (HCC)    • Depression    • Esophageal reflux    • Essential hypertension    • High blood pressure    • High cholesterol    • Hyperlipidemia    • Lumbar radiculopathy    • Osteoarthrosis, unspecified whether gene regular. Abdomen-bowel sounds normal, no organomegaly, no tenderness to palpation. No masses.   -no flank tenderness  Extremities-no cyanosis clubbing or edema    Objectives:    Results for orders placed or performed in visit on 01/22/18  -PANCREATIC EL

## 2018-02-28 NOTE — TELEPHONE ENCOUNTER
Pt's  verified  Pt requesting Abx to be sent to correct phmcy - Levaquin sent to [facility] location was sent by md in error to [place]

## 2018-02-28 NOTE — PROGRESS NOTES
Pt presented to clinic today for blood draw. Per physician able to draw orders. Orders  documented within chart. Pt tolerated lab draw well.  verified.   Orders drawn include: mag, bmp, cbc   Site of draw: rt she Quach, DUONG

## 2018-03-26 ENCOUNTER — MED REC SCAN ONLY (OUTPATIENT)
Dept: INTERNAL MEDICINE CLINIC | Facility: CLINIC | Age: 83
End: 2018-03-26

## 2018-04-16 DIAGNOSIS — I10 ESSENTIAL HYPERTENSION: ICD-10-CM

## 2018-04-16 RX ORDER — FUROSEMIDE 20 MG/1
TABLET ORAL
Qty: 60 TABLET | Refills: 1 | Status: SHIPPED | OUTPATIENT
Start: 2018-04-16 | End: 2018-07-18

## 2018-04-29 DIAGNOSIS — R41.3 MEMORY DISORDER: ICD-10-CM

## 2018-04-30 RX ORDER — MONTELUKAST SODIUM 4 MG/1
TABLET, CHEWABLE ORAL
Qty: 90 TABLET | Refills: 0 | Status: SHIPPED | OUTPATIENT
Start: 2018-04-30 | End: 2018-07-26

## 2018-04-30 NOTE — TELEPHONE ENCOUNTER
See refill request  Patient last seen 1-16-18.    Medication last refilled 1-16-18    Pending Prescriptions Disp Refills    COLESTIPOL HCL 1 g Oral Tab [Pharmacy Med Name: COLESTIPOL MICRONIZED 1 GM TAB] 90 tablet 0     Sig: TAKE 1 TABLET BY MOUTH EVERY NIG

## 2018-05-01 RX ORDER — DONEPEZIL HYDROCHLORIDE 5 MG/1
TABLET, FILM COATED ORAL
Qty: 90 TABLET | Refills: 0 | Status: SHIPPED | OUTPATIENT
Start: 2018-05-01 | End: 2018-07-26

## 2018-07-18 DIAGNOSIS — I10 ESSENTIAL HYPERTENSION: ICD-10-CM

## 2018-07-18 RX ORDER — FUROSEMIDE 20 MG/1
TABLET ORAL
Qty: 60 TABLET | Refills: 0 | Status: SHIPPED | OUTPATIENT
Start: 2018-07-18 | End: 2018-08-30

## 2018-07-26 DIAGNOSIS — R41.3 MEMORY DISORDER: ICD-10-CM

## 2018-07-26 RX ORDER — MONTELUKAST SODIUM 4 MG/1
TABLET, CHEWABLE ORAL
Qty: 90 TABLET | Refills: 0 | Status: ON HOLD | OUTPATIENT
Start: 2018-07-26 | End: 2018-10-02

## 2018-07-26 RX ORDER — DONEPEZIL HYDROCHLORIDE 5 MG/1
TABLET, FILM COATED ORAL
Qty: 90 TABLET | Refills: 0 | Status: SHIPPED | OUTPATIENT
Start: 2018-07-26 | End: 2019-01-01

## 2018-07-26 NOTE — TELEPHONE ENCOUNTER
Pending Prescriptions Disp Refills    COLESTIPOL HCL 1 g Oral Tab [Pharmacy Med Name: COLESTIPOL MICRONIZED 1 GM TAB] 90 tablet 0     Sig: TAKE 1 TABLET BY MOUTH EVERY NIGHT         LOV 1/16/18  LR 4/30/18    em

## 2018-08-29 DIAGNOSIS — I10 ESSENTIAL HYPERTENSION: ICD-10-CM

## 2018-08-29 DIAGNOSIS — M54.17 L-S RADICULOPATHY: ICD-10-CM

## 2018-08-29 RX ORDER — METOPROLOL SUCCINATE 25 MG/1
TABLET, EXTENDED RELEASE ORAL
COMMUNITY
Start: 2015-02-13 | End: 2018-10-17

## 2018-08-29 NOTE — TELEPHONE ENCOUNTER
Sridhar Saul called for refills for Mr. David Fields for his FUROSEMIDE 20 MG pharmacy would like to know if patient can have a 3 month supply.

## 2018-08-30 RX ORDER — FUROSEMIDE 20 MG/1
TABLET ORAL
Qty: 42 TABLET | Refills: 0 | Status: SHIPPED | OUTPATIENT
Start: 2018-08-30 | End: 2018-09-25

## 2018-08-30 RX ORDER — METOPROLOL SUCCINATE 25 MG/1
25 TABLET, EXTENDED RELEASE ORAL DAILY
Qty: 30 TABLET | Refills: 0 | Status: SHIPPED | OUTPATIENT
Start: 2018-08-30 | End: 2018-09-26 | Stop reason: ALTCHOICE

## 2018-09-21 ENCOUNTER — TELEPHONE (OUTPATIENT)
Dept: INTERNAL MEDICINE CLINIC | Facility: CLINIC | Age: 83
End: 2018-09-21

## 2018-09-21 NOTE — TELEPHONE ENCOUNTER
Patient's EC called asking if appt from 09/29 can be moved up sooner; according to caregiver, patient having swelling on knees and legs and some shortness of breath when they go out for a walk.     Appt rescheduled to 09/26 Wednesday; but advised Reina Armenta that

## 2018-09-25 DIAGNOSIS — I10 ESSENTIAL HYPERTENSION: ICD-10-CM

## 2018-09-25 RX ORDER — FUROSEMIDE 20 MG/1
TABLET ORAL
Qty: 42 TABLET | Refills: 0 | Status: ON HOLD | OUTPATIENT
Start: 2018-09-25 | End: 2018-09-29

## 2018-09-26 ENCOUNTER — OFFICE VISIT (OUTPATIENT)
Dept: INTERNAL MEDICINE CLINIC | Facility: CLINIC | Age: 83
End: 2018-09-26
Payer: MEDICARE

## 2018-09-26 ENCOUNTER — HOSPITAL ENCOUNTER (INPATIENT)
Facility: HOSPITAL | Age: 83
LOS: 6 days | Discharge: NURSING, REHAB OR LONG-TERM CARE | DRG: 293 | End: 2018-10-02
Attending: INTERNAL MEDICINE | Admitting: INTERNAL MEDICINE
Payer: MEDICARE

## 2018-09-26 VITALS
WEIGHT: 131 LBS | HEART RATE: 67 BPM | RESPIRATION RATE: 18 BRPM | BODY MASS INDEX: 24.11 KG/M2 | HEIGHT: 62 IN | OXYGEN SATURATION: 98 % | TEMPERATURE: 97 F | DIASTOLIC BLOOD PRESSURE: 70 MMHG | SYSTOLIC BLOOD PRESSURE: 130 MMHG

## 2018-09-26 DIAGNOSIS — I10 ESSENTIAL HYPERTENSION: ICD-10-CM

## 2018-09-26 DIAGNOSIS — I50.9 ACUTE ON CHRONIC CONGESTIVE HEART FAILURE, UNSPECIFIED HEART FAILURE TYPE (HCC): Primary | ICD-10-CM

## 2018-09-26 PROBLEM — M05.79 RHEUMATOID ARTHRITIS INVOLVING MULTIPLE SITES WITH POSITIVE RHEUMATOID FACTOR (HCC): Status: ACTIVE | Noted: 2018-09-26

## 2018-09-26 PROCEDURE — 99223 1ST HOSP IP/OBS HIGH 75: CPT | Performed by: INTERNAL MEDICINE

## 2018-09-26 RX ORDER — ACETAMINOPHEN 500 MG
500 TABLET ORAL EVERY 6 HOURS PRN
COMMUNITY

## 2018-09-26 RX ORDER — FUROSEMIDE 10 MG/ML
20 INJECTION INTRAMUSCULAR; INTRAVENOUS ONCE
Status: COMPLETED | OUTPATIENT
Start: 2018-09-26 | End: 2018-09-26

## 2018-09-26 RX ORDER — ALPRAZOLAM 0.25 MG/1
0.25 TABLET ORAL ONCE
Status: COMPLETED | OUTPATIENT
Start: 2018-09-26 | End: 2018-09-26

## 2018-09-26 RX ORDER — POTASSIUM CHLORIDE 20 MEQ/1
40 TABLET, EXTENDED RELEASE ORAL EVERY 4 HOURS
Status: COMPLETED | OUTPATIENT
Start: 2018-09-26 | End: 2018-09-26

## 2018-09-26 RX ORDER — 0.9 % SODIUM CHLORIDE 0.9 %
VIAL (ML) INJECTION
Status: COMPLETED
Start: 2018-09-26 | End: 2018-09-26

## 2018-09-26 RX ORDER — ENOXAPARIN SODIUM 100 MG/ML
30 INJECTION SUBCUTANEOUS EVERY 24 HOURS
Status: DISCONTINUED | OUTPATIENT
Start: 2018-09-26 | End: 2018-10-02

## 2018-09-26 RX ORDER — SPIRONOLACTONE 25 MG/1
12.5 TABLET ORAL
Status: DISCONTINUED | OUTPATIENT
Start: 2018-09-27 | End: 2018-10-02

## 2018-09-26 RX ORDER — ASPIRIN 81 MG/1
81 TABLET ORAL DAILY
Status: DISCONTINUED | OUTPATIENT
Start: 2018-09-26 | End: 2018-10-02

## 2018-09-26 RX ORDER — FUROSEMIDE 10 MG/ML
10 INJECTION INTRAMUSCULAR; INTRAVENOUS ONCE
Status: COMPLETED | OUTPATIENT
Start: 2018-09-27 | End: 2018-09-27

## 2018-09-26 RX ORDER — MULTIPLE VITAMINS W/ MINERALS TAB 9MG-400MCG
1 TAB ORAL DAILY
Status: DISCONTINUED | OUTPATIENT
Start: 2018-09-26 | End: 2018-10-02

## 2018-09-26 RX ORDER — SPIRONOLACTONE 25 MG/1
12.5 TABLET ORAL EVERY EVENING
Status: DISCONTINUED | OUTPATIENT
Start: 2018-09-26 | End: 2018-09-26

## 2018-09-26 RX ORDER — ACETAMINOPHEN 500 MG
500 TABLET ORAL EVERY 6 HOURS PRN
Status: DISCONTINUED | OUTPATIENT
Start: 2018-09-26 | End: 2018-10-02

## 2018-09-26 RX ORDER — METOPROLOL SUCCINATE 25 MG/1
25 TABLET, EXTENDED RELEASE ORAL
Status: DISCONTINUED | OUTPATIENT
Start: 2018-09-27 | End: 2018-10-02

## 2018-09-26 RX ORDER — DONEPEZIL HYDROCHLORIDE 5 MG/1
5 TABLET, FILM COATED ORAL
Status: DISCONTINUED | OUTPATIENT
Start: 2018-09-26 | End: 2018-10-02

## 2018-09-26 NOTE — H&P
[address] [facility],  [address] Patient Status:  Inpatient    1925 MRN [id number]   Location [facility] 3W/SW Attending Mary Cristina MD   Hosp Day # 0 PCP Juan Chen MD     Date:   COLONOSCOPY  No date: HYSTERECTOMY  40 yrs ago: OTHER SURGICAL HISTORY      Comment:  lumbosacral surgery  Family History   Problem Relation Age of Onset   • Heart Disease Mother 72        CAD     Social History:  Social History    Tobacco Use      Smoking facility-administered medications on file prior to encounter. Current Outpatient Medications on File Prior to Encounter:  acetaminophen 500 MG Oral Tab Take 500 mg by mouth every 6 (six) hours as needed for Pain.  Disp:  Rfl:    furosemide 20 MG Oral Tab deficits  Musculoskeletal: Moves all extremities well, general weakness  Psychiatric: Pleasant, calm      Results:     Lab Results   Component Value Date    WBC 3.7 (L) 09/26/2018    HGB 11.7 (L) 09/26/2018    HCT 36.2 09/26/2018     (L) 09/26/2018 MD  9/26/2018

## 2018-09-26 NOTE — PROGRESS NOTES
Janel Jefferson is a 80year old female. Patient presents with: Follow - Up: pt presents to clinic for follow up visit c/o leg swelling +SOB. needs prevnar 13       HPI:      Still diarrhea daily-first stool formed, tho 2 other stools watery.   Stools blood pressure    • High cholesterol    • Hyperlipidemia    • Lumbar radiculopathy    • Osteoarthrosis, unspecified whether generalized or localized, unspecified site    • Paroxysmal atrial fibrillation [facility])    • Radiculopathy    • Spinal stenosis    • Sp masses. Extremities-support hose on.   Pitting edema to thighs bilaterally    Objectives:  Results for orders placed or performed in visit on 02/28/18   URINALYSIS, NONAUTO, W/SCOPE   Result Value Ref Range    GLUCOSE (URINE DIPSTICK) negative mg/dL    CRUZ K/UL    RBC 3.54 (L) 3.70 - 5.40 M/UL    HGB 11.7 (L) 12.0 - 16.0 g/dL    HCT 35.2 35.0 - 48.0 %    MCV 99.4 80.0 - 100.0 fL    MCH 33.1 (H) 27.0 - 32.0 pg    MCHC 33.3 32.0 - 37.0 g/dl    RDW 14.2 11.0 - 15.0 %     (L) 140 - 400 K/UL    MPV 10.6 (H

## 2018-09-27 ENCOUNTER — APPOINTMENT (OUTPATIENT)
Dept: GENERAL RADIOLOGY | Facility: HOSPITAL | Age: 83
DRG: 293 | End: 2018-09-27
Attending: INTERNAL MEDICINE
Payer: MEDICARE

## 2018-09-27 ENCOUNTER — APPOINTMENT (OUTPATIENT)
Dept: CV DIAGNOSTICS | Facility: HOSPITAL | Age: 83
DRG: 293 | End: 2018-09-27
Attending: INTERNAL MEDICINE
Payer: MEDICARE

## 2018-09-27 ENCOUNTER — PRIOR ORIGINAL RECORDS (OUTPATIENT)
Dept: OTHER | Age: 83
End: 2018-09-27

## 2018-09-27 PROCEDURE — 93306 TTE W/DOPPLER COMPLETE: CPT | Performed by: INTERNAL MEDICINE

## 2018-09-27 PROCEDURE — 99232 SBSQ HOSP IP/OBS MODERATE 35: CPT | Performed by: INTERNAL MEDICINE

## 2018-09-27 PROCEDURE — 71046 X-RAY EXAM CHEST 2 VIEWS: CPT | Performed by: INTERNAL MEDICINE

## 2018-09-27 RX ORDER — FAMOTIDINE 20 MG/1
20 TABLET ORAL EVERY EVENING
Status: DISCONTINUED | OUTPATIENT
Start: 2018-09-27 | End: 2018-10-02

## 2018-09-27 RX ORDER — FUROSEMIDE 10 MG/ML
20 INJECTION INTRAMUSCULAR; INTRAVENOUS ONCE
Status: COMPLETED | OUTPATIENT
Start: 2018-09-27 | End: 2018-09-27

## 2018-09-27 NOTE — PROGRESS NOTES
Fernando Bazzi is a 80year old female. No chief complaint on file. HPI:       Slightly restless, mildly confused at night although slept fairly well with Xanax low dose. Presently getting up to go to the bathroom.   No complaints of diarrhea at 09/27/18  0515 09/27/18  0530 09/27/18  0653 09/27/18  0812   BP:   144/66 126/72   Pulse: 64 68 80 66   Resp:   20 20   Temp:   97.3 °F (36.3 °C)    TempSrc:   Oral Oral   SpO2:   95% 97%   Weight:   141 lb 4.8 oz    Height:         Body mass index is 24. Negative Negative mg/dL    Ketones Urine Negative Negative mg/dL    Bilirubin Urine Negative Negative    Blood Urine Negative Negative    Nitrite Urine Negative Negative    Urobilinogen Urine <2.0 <2.0    Leukocyte Esterase Urine Negative Negative    Ascor Absolute 0.5 (L) 1.0 - 4.0 K/UL    Monocyte Absolute 0.3 0.0 - 1.0 K/UL    Eosinophil Absolute 0.0 0.0 - 0.7 K/UL    Basophil Absolute 0.0 0.0 - 0.2 K/UL        Xr Chest Pa + Lat Chest (cpt=71046)    Result Date: 9/27/2018  PROCEDURE: XR CHEST PA + LAT REYNOLD a.m. once patient diuresis begun. Continue Aricept. 3.  History diarrhea, chronic. To check stool PCR. 4.  Rheumatoid arthritis, symptoms controlled with Tylenol. 5.  Atrial fibrillation, paroxysmal.  6.  Hypertension, controlled  7.   Hyperlipidemi

## 2018-09-27 NOTE — DIETARY NOTE
ADULT NUTRITION INITIAL ASSESSMENT    Pt is at moderate nutrition risk. Pt does not meet malnutrition criteria.       RECOMMENDATIONS TO MD:  See Nutrition Intervention     NUTRITION DIAGNOSIS/PROBLEM:  Inadequate oral intake related to physiological cause RA   • Atrial fibrillation (HCC)    • CHF (congestive heart failure) (HCC)    • Congestive heart disease (HCC)    • Depression    • Essential hypertension    • Hearing impairment    • High blood pressure    • High cholesterol    • Hyperlipidemia    • Ariella Subcutaneous Q24H   • spironolactone  12.5 mg Oral Once per day on Sun Tue Thu Fri       LABS: noted  Recent Labs      09/26/18   1640  09/27/18   0702   GLU  99  99   BUN  22*  21*   CREATSERUM  0.97  1.01   CA  9.1  9.1   MG  2.1  2.0   NA  142  145*   K

## 2018-09-27 NOTE — PHYSICAL THERAPY NOTE
PHYSICAL THERAPY EVALUATION - INPATIENT     Room Number: 310/310-A  Evaluation Date: 9/27/2018  Type of Evaluation: Initial   Physician Order: PT Eval and Treat    Presenting Problem: p/w SOB w/ exertion, edema, dx acute on chronic HF  Reason for Therapy: discharge. DISCHARGE RECOMMENDATIONS  PT Discharge Recommendations: 24 hour care/supervision;Sub-acute rehabilitation    PLAN  PT Treatment Plan: Bed mobility; Body mechanics; Endurance; Energy conservation;Patient education;Gait training;Strengthening;Tra a \"friend\" visiting her every day to help with IADLs. *Pt is poor historian* After discussed case with SW, per son- pt lives in a multi-level house with 2 CONY, no STI.  She has a caregiver 5-7 hours/day for 5x/week to assist with bathing, cooking, cleanin 91% on room air, no SOB  HR 71 bpm  /84 mmHg  Increased fatigue post, improving with seated rest     AM-PAC '6-Clicks' INPATIENT SHORT FORM - BASIC MOBILITY  How much difficulty does the patient currently have. ..  -   Turning over in bed (including a Current Status    Goal #2 Patient is able to demonstrate transfers Sit to/from Stand at assistance level: supervision with walker - rolling     Goal #2  Current Status    Goal #3 Patient is able to ambulate 150 feet with assist device: walker - rolling a

## 2018-09-27 NOTE — CM/SW NOTE
09/27/18 1100   CM/SW Screening   Patient's Mental Status Alert;Memory Impairments   Patient's Home Environment House   Number of Levels in Home (Split level)   Number of Stair in Home (2)   Patient lives with Caregiver  (part time 5 days week/6-7 hrs d

## 2018-09-28 ENCOUNTER — TELEPHONE (OUTPATIENT)
Dept: INTERNAL MEDICINE CLINIC | Facility: CLINIC | Age: 83
End: 2018-09-28

## 2018-09-28 PROCEDURE — 99232 SBSQ HOSP IP/OBS MODERATE 35: CPT | Performed by: INTERNAL MEDICINE

## 2018-09-28 RX ORDER — FUROSEMIDE 10 MG/ML
20 INJECTION INTRAMUSCULAR; INTRAVENOUS ONCE
Status: COMPLETED | OUTPATIENT
Start: 2018-09-28 | End: 2018-09-28

## 2018-09-28 RX ORDER — FUROSEMIDE 10 MG/ML
20 INJECTION INTRAMUSCULAR; INTRAVENOUS ONCE
Status: COMPLETED | OUTPATIENT
Start: 2018-09-29 | End: 2018-09-29

## 2018-09-28 RX ORDER — 0.9 % SODIUM CHLORIDE 0.9 %
VIAL (ML) INJECTION
Status: COMPLETED
Start: 2018-09-28 | End: 2018-09-28

## 2018-09-28 RX ORDER — ALPRAZOLAM 0.25 MG/1
0.25 TABLET ORAL 3 TIMES DAILY PRN
Status: DISCONTINUED | OUTPATIENT
Start: 2018-09-28 | End: 2018-10-02

## 2018-09-28 RX ORDER — ALPRAZOLAM 0.5 MG/1
0.5 TABLET ORAL NIGHTLY PRN
Status: DISCONTINUED | OUTPATIENT
Start: 2018-09-28 | End: 2018-10-02

## 2018-09-28 NOTE — CM/SW NOTE
STACEY spoke with pt's son Daysi Krishnan [phone] regarding [facility] choice. Daysi Krishnan stated that he will not make a decision about [facility] choice until he speaks with the pt's MD. Daysi Krishnan states that he has many questions about the pt's medical status.  STACEY encouraged Daysi Krishnan to d

## 2018-09-28 NOTE — TELEPHONE ENCOUNTER
Son called would like to speak to Dr. Woodroe Alpers regarding patient's hospitalization. Stated they ran some tests, but haven't quite heard back from anybody and that he can't really talk with a hospitalist as Dr. Woodroe Alpers is the attending on file (inpatient).

## 2018-09-28 NOTE — PROGRESS NOTES
[facility]    Progress Note    Ro Luke Patient Status:  Inpatient    1925 MRN [id number]   Location [facility] 3W/SW Attending Sabrina Gerber MD   Hosp Day # 2 PCP Jelena Francisco MD       Subjective: Vitamins-Minerals (MULTIVITAL) Oral Tab Take 1 tablet by mouth daily. Disp:  Rfl:    aspirin 81 MG Oral Tab Take 81 mg by mouth daily. Disp:  Rfl:    ESTRACE 0.1 MG/GM Vaginal Cream Place 4 g vaginally 3 (three) times a week.    Disp:  Rfl:         Objectiv very low-dose Aricept, may increase if tolerated      Chronic diarrhea  Check PCR stool      Hypertension, well controlled        Results:     Lab Results   Component Value Date    WBC 3.0 (L) 09/28/2018    HGB 11.2 (L) 09/28/2018    HCT 34.6 (L) 09/28/201

## 2018-09-28 NOTE — HISTORICAL OFFICE NOTE
Kelly Delong  : 1925  ACCOUNT:  [id number]  [phone]  PCP: Dr. Ana Benitez    TODAY'S DATE: 2017  DICTATED BY:  Matthew Alvares M.D.]    CHIEF COMPLAINT: [Followup of Chronic atrial fibrillation, Followup of Hypercholesteremia, pure, Fol Raynauds,GERD, thyroidmegaly, multinodular goiter, peripheral neuropathy, microvascular CNS disease, rheumatoid arthritis, cholecystectomy, L cataract, cyst on spine, bilat carpal tunnel and tonsillectomy    PAST CV HISTORY: atrial fibrillation, carotid ar with a little bit of a pulse deficit, but I do not want to change her medications, knowing that her pressure is 122/70 mmHg, and she does not exercise much. She walks with a walker with very limited activity.  I think the rate we have now is her rate most o 25MG        1/2 tablet  tuesday and sunday 08/03/16 Hair/Skin/Nails                 1 TABLET DAILY.   04/10/15 Aspir-81              81MG      1 tablet  04/10/15 Claritin              10MG      1 tablet daily  04/10/15 Multivitamins                   1 cap

## 2018-09-28 NOTE — CONSULTS
[facility]    Report of Consultation    Suzanna Mtz Patient Status:  Inpatient    1925 MRN [id number]   Location [facility] 3W/SW Attending Kavita Jama MD   Hosp Day # 2 PCP Sarina Fay MD     Date function mild AI MR and severe TR.   Past Medical History        Past Medical History:   Diagnosis Date   • Anxiety state, unspecified    • Arrhythmia     A fib   • Arthritis     RA   • Atrial fibrillation (HCC)    • CHF (congestive heart failure) ([facility] [address] Oral Once per day on Sun Tue Thu Fri       Medications Prior to Admission:  acetaminophen 500 MG Oral Tab Take 500 mg by mouth every 6 (six) hours as needed for Pain.    furosemide 20 MG Oral Tab Patient needs appointment before further refills (Patient laine distress. HEENT:  Normocephalic, anicteric sclera, neck supple, no thyromegaly or adenopathy. Neck:  No JVD, carotids 2+, no bruits. Cardiac: Irregular rate and rhythm.  S1, S2 normal.  2/6 systolic ejection murmur   lungs: Diminished bilaterally one thi

## 2018-09-29 ENCOUNTER — PRIOR ORIGINAL RECORDS (OUTPATIENT)
Dept: OTHER | Age: 83
End: 2018-09-29

## 2018-09-29 ENCOUNTER — APPOINTMENT (OUTPATIENT)
Dept: GENERAL RADIOLOGY | Facility: HOSPITAL | Age: 83
DRG: 293 | End: 2018-09-29
Attending: INTERNAL MEDICINE
Payer: MEDICARE

## 2018-09-29 PROCEDURE — 71046 X-RAY EXAM CHEST 2 VIEWS: CPT | Performed by: INTERNAL MEDICINE

## 2018-09-29 RX ORDER — ALPRAZOLAM 0.25 MG/1
0.25 TABLET ORAL 3 TIMES DAILY PRN
Qty: 30 TABLET | Refills: 0 | Status: SHIPPED | OUTPATIENT
Start: 2018-09-29 | End: 2018-10-02

## 2018-09-29 RX ORDER — FAMOTIDINE 20 MG/1
20 TABLET ORAL EVERY EVENING
Qty: 90 TABLET | Refills: 0 | Status: ON HOLD | OUTPATIENT
Start: 2018-09-29 | End: 2019-01-01

## 2018-09-29 RX ORDER — SPIRONOLACTONE 25 MG/1
12.5 TABLET ORAL 2 TIMES DAILY
Qty: 14 TABLET | Refills: 0 | Status: SHIPPED | OUTPATIENT
Start: 2018-09-29 | End: 2018-10-02

## 2018-09-29 RX ORDER — 0.9 % SODIUM CHLORIDE 0.9 %
VIAL (ML) INJECTION
Status: COMPLETED
Start: 2018-09-29 | End: 2018-09-29

## 2018-09-29 RX ORDER — FUROSEMIDE 20 MG/1
TABLET ORAL
Qty: 42 TABLET | Refills: 0 | Status: SHIPPED | OUTPATIENT
Start: 2018-09-29 | End: 2018-10-02

## 2018-09-29 RX ORDER — ASPIRIN 81 MG/1
81 TABLET ORAL DAILY
Qty: 90 TABLET | Refills: 0 | Status: SHIPPED | OUTPATIENT
Start: 2018-09-30 | End: 2018-01-01

## 2018-09-29 RX ORDER — ALPRAZOLAM 0.5 MG/1
0.5 TABLET ORAL NIGHTLY PRN
Qty: 30 TABLET | Refills: 0 | Status: SHIPPED | OUTPATIENT
Start: 2018-09-29 | End: 2018-10-26 | Stop reason: ALTCHOICE

## 2018-09-29 NOTE — PROGRESS NOTES
[facility]    Progress Note    Tyesha Greer Patient Status:  Inpatient    1925 MRN [id number]   Location [facility] 3W/SW Attending Krystle Jackson MD   Hosp Day # 3 PCP MD Gloria Mckeon 09/29/2018     09/29/2018    K 3.6 09/29/2018     09/29/2018    CO2 27 09/29/2018     (H) 09/29/2018    CA 8.5 09/29/2018    ALB 4.0 09/26/2018    ALKPHO 73 09/26/2018    BILT 1.3 (H) 09/26/2018    TP 7.0 09/26/2018    AST 59 (H) 09/26/2

## 2018-09-29 NOTE — PROGRESS NOTES
[facility]    Progress Note    April Fagan Patient Status:  Inpatient    1925 MRN [id number]   Location [facility] 3W/SW Attending Apoorva Ni MD   Hosp Day # 3 PCP Rabia Castro MD       Subjective: Soft, non-tender. No mass or rebound, BS-present x 4. Extremities: Without cyanosis or edema.   Peripheral pulses are 2+, equal and bilateral.        Assessment and Plan:      Dyspnea/CHF (congestive heart failure) ([address]  -Patient with vague shortness of b 14:17 by Michael Kelly NP  9/29/2018

## 2018-09-29 NOTE — CM/SW NOTE
STACEY spoke w/ pt's son Marilu Ayoub [phone] who would like pt to go to [facility]. ERIKA mccall called to [facility].      STACEY made referral to [facility] via [employer] and notified Turner/Tammy Peña regarding referral and inquired if they are able to accept; awaiting re

## 2018-09-30 NOTE — TRANSITION NOTE
[facility]    Cardiology Discharge Summary    Shabana Young Patient Status:  Inpatient    1925 MRN [id number]   Location [facility] 3W/SW Attending Oneyda Allen MD   Hosp Day # 4 PCP Hawa San MD times daily as needed for Anxiety. Quantity:  30 tablet  Refills:  0     ALPRAZolam 0.5 MG Tabs  Commonly known as:  XANAX      Take 1 tablet (0.5 mg total) by mouth nightly as needed for Anxiety.    Quantity:  30 tablet  Refills:  0     aspirin 81 MG Tbe Tabs  Replaced by:  aspirin 81 MG Tbec              Where to Get Your Medications      Please  your prescriptions at the location directed by your doctor or nurse    Bring a paper prescription for each of these medications  · ALPRAZolam 0.25 MG Tabs

## 2018-09-30 NOTE — PHYSICAL THERAPY NOTE
PHYSICAL THERAPY TREATMENT NOTE - INPATIENT     Room Number: 305/857-O       Presenting Problem: Pt admitted w/ acute on chronic CHF    Problem List  Active Problems:    CHF (congestive heart failure) ([address]    Pure hypercholesterolemia    Rheumatoid arthri INPATIENT SHORT FORM - BASIC MOBILITY  How much difficulty does the patient currently have. ..  -   Turning over in bed (including adjusting bedclothes, sheets and blankets)?: A Little   -   Sitting down on and standing up from a chair with arms (e.g., whee with home activity/exercise instructions provided to patient in preparation for discharge.    Goal #5   Current Status     Goal #6     Goal #6  Current Status

## 2018-09-30 NOTE — TRANSITION NOTE
[facility]    Cardiology Discharge Summary    Emi Hay Patient Status:  Inpatient    1925 MRN [id number]   Location [facility] 3W/SW Attending Roland Alonzo MD   Hosp Day # 4 PCP Gumaro Leong MD times daily as needed for Anxiety. Quantity:  30 tablet  Refills:  0     ALPRAZolam 0.5 MG Tabs  Commonly known as:  XANAX      Take 1 tablet (0.5 mg total) by mouth nightly as needed for Anxiety.    Quantity:  30 tablet  Refills:  0     aspirin 81 MG Tbe Tabs  Replaced by:  aspirin 81 MG Tbec              Where to Get Your Medications      Please  your prescriptions at the location directed by your doctor or nurse    Bring a paper prescription for each of these medications  · ALPRAZolam 0.25 MG Tabs

## 2018-09-30 NOTE — PROGRESS NOTES
[facility]    Progress Note    Susan Lopez Patient Status:  Inpatient    1925 MRN [id number]   Location [facility] 3W/SW Attending Ladon Aschoff, MD   [facility] Day # 4 PCP MD Marisa Argueta LIP 54 (H) 03/03/2017    MG 1.9 09/29/2018    TROP 0.03 09/26/2018    CK 78 03/03/2017    B12 397 03/16/2017       Xr Chest Pa + Lat Chest (cpt=71046)    Result Date: 9/29/2018  CONCLUSION:   Pulmonary edema with bilateral pleural effusions, not significan

## 2018-09-30 NOTE — SIGNIFICANT EVENT
RN assisted patient to the bathroom. RN spoke with PT, PT recommended patient ambulate 1x assist with walker. Patient c/o weakness while ambulating with nurse. Pt had assisted fall. No head trauma. RN assisted patient into a sitting position on the floor.

## 2018-10-01 PROCEDURE — 99233 SBSQ HOSP IP/OBS HIGH 50: CPT | Performed by: INTERNAL MEDICINE

## 2018-10-01 RX ORDER — FUROSEMIDE 10 MG/ML
20 INJECTION INTRAMUSCULAR; INTRAVENOUS ONCE
Status: COMPLETED | OUTPATIENT
Start: 2018-10-01 | End: 2018-10-01

## 2018-10-01 RX ORDER — CASTOR OIL AND BALSAM, PERU 788; 87 MG/G; MG/G
OINTMENT TOPICAL 2 TIMES DAILY PRN
Status: DISCONTINUED | OUTPATIENT
Start: 2018-10-01 | End: 2018-10-02

## 2018-10-01 RX ORDER — 0.9 % SODIUM CHLORIDE 0.9 %
VIAL (ML) INJECTION
Status: DISPENSED
Start: 2018-10-01 | End: 2018-10-02

## 2018-10-01 RX ORDER — FUROSEMIDE 10 MG/ML
10 INJECTION INTRAMUSCULAR; INTRAVENOUS ONCE
Status: COMPLETED | OUTPATIENT
Start: 2018-10-01 | End: 2018-10-01

## 2018-10-01 NOTE — CM/SW NOTE
Addend 323p:     SW met w/ pt's son and informed him that [facility] and [facility]  do no have beds available and inquired about other referrals being made to check availability and acceptance.     SW inquired about other SNF's in which pt's son, Louis Alonzo, requested r

## 2018-10-01 NOTE — PROGRESS NOTES
[facility] HOSP - [facility]    Progress Note    Avelina Juarez Patient Status:  Inpatient    1925 MRN [id number]   Location [facility] 3W/SW Attending Dorita Lozano MD   Hosp Day # 5 PCP Brittni Whitmore MD       Subjective °F (36.7 °C)   TempSrc:  Oral Oral Oral   SpO2:  94% 96% 97%   Weight: 127 lb 9.6 oz      Height:             Intake/Output Summary (Last 24 hours) at 10/1/2018 1639  Last data filed at 10/1/2018 1451  Gross per 24 hour   Intake 925 ml   Output 200 ml   Ne regurgitation,  Mild aortic regurgitation  Neutropenia-stable.           Results:     Lab Results   Component Value Date    WBC 3.0 (L) 09/28/2018    HGB 11.2 (L) 09/28/2018    HCT 34.6 (L) 09/28/2018     (L) 09/28/2018    CREATSERUM 0.91 09/29/2018

## 2018-10-02 ENCOUNTER — PRIOR ORIGINAL RECORDS (OUTPATIENT)
Dept: OTHER | Age: 83
End: 2018-10-02

## 2018-10-02 ENCOUNTER — TELEPHONE (OUTPATIENT)
Dept: INTERNAL MEDICINE CLINIC | Facility: CLINIC | Age: 83
End: 2018-10-02

## 2018-10-02 VITALS
DIASTOLIC BLOOD PRESSURE: 60 MMHG | HEART RATE: 74 BPM | OXYGEN SATURATION: 98 % | HEIGHT: 64 IN | TEMPERATURE: 97 F | RESPIRATION RATE: 18 BRPM | BODY MASS INDEX: 21.43 KG/M2 | SYSTOLIC BLOOD PRESSURE: 131 MMHG | WEIGHT: 125.5 LBS

## 2018-10-02 PROBLEM — I50.9 ACUTE ON CHRONIC HEART FAILURE (HCC): Status: RESOLVED | Noted: 2018-09-26 | Resolved: 2018-10-02

## 2018-10-02 PROCEDURE — 99239 HOSP IP/OBS DSCHRG MGMT >30: CPT | Performed by: INTERNAL MEDICINE

## 2018-10-02 RX ORDER — ESTRADIOL 0.1 MG/G
1 CREAM VAGINAL
Qty: 1 TUBE | Refills: 0 | Status: ON HOLD | OUTPATIENT
Start: 2018-10-04 | End: 2019-01-01

## 2018-10-02 RX ORDER — FUROSEMIDE 10 MG/ML
20 INJECTION INTRAMUSCULAR; INTRAVENOUS ONCE
Status: COMPLETED | OUTPATIENT
Start: 2018-10-02 | End: 2018-10-02

## 2018-10-02 RX ORDER — 0.9 % SODIUM CHLORIDE 0.9 %
VIAL (ML) INJECTION
Status: COMPLETED
Start: 2018-10-02 | End: 2018-10-02

## 2018-10-02 RX ORDER — CASTOR OIL AND BALSAM, PERU 788; 87 MG/G; MG/G
OINTMENT TOPICAL
Qty: 1 TUBE | Refills: 3 | Status: ON HOLD | OUTPATIENT
Start: 2018-10-02 | End: 2019-01-01 | Stop reason: ALTCHOICE

## 2018-10-02 RX ORDER — SPIRONOLACTONE 25 MG/1
12.5 TABLET ORAL EVERY EVENING
Qty: 30 TABLET | Refills: 1 | Status: SHIPPED | OUTPATIENT
Start: 2018-10-02 | End: 2019-01-01

## 2018-10-02 RX ORDER — ALPRAZOLAM 0.25 MG/1
0.25 TABLET ORAL 2 TIMES DAILY PRN
Qty: 90 TABLET | Refills: 0 | Status: SHIPPED | OUTPATIENT
Start: 2018-10-02 | End: 2019-01-01 | Stop reason: ALTCHOICE

## 2018-10-02 RX ORDER — FUROSEMIDE 40 MG/1
40 TABLET ORAL DAILY
Qty: 30 TABLET | Refills: 1 | Status: ON HOLD | OUTPATIENT
Start: 2018-10-02 | End: 2019-01-01 | Stop reason: DRUGHIGH

## 2018-10-02 NOTE — PLAN OF CARE
Problem: Patient Centered Care  Goal: Patient preferences are identified and integrated in the patient's plan of care  Interventions:  - What would you like us to know as we care for you?  \"I go to the bathroom a lot\"  - Provide timely, complete, and accu needed discharge resources and transportation as appropriate  - Identify discharge learning needs (meds, wound care, etc)  - Arrange for interpreters to assist at discharge as needed  - Consider post-discharge preferences of patient/family/discharge partne Monitor response to electrolyte replacements, including rhythm and repeat lab results as appropriate  - Fluid restriction as ordered  - Instruct patient on fluid and nutrition restrictions as appropriate  Outcome: Adequate for Discharge      Problem: Allan Mensah

## 2018-10-02 NOTE — DISCHARGE SUMMARY
[facility]    Discharge Summary    Reymundo Najera Patient Status:  Inpatient    1925 MRN [id number]   Location [facility] 3W/SW Attending Riya Scanlon MD   Hosp Day # 6 PCP Nikhil Chan MD     Date of Ad None    Consultants     Provider Role Specialty    Caren Hamilton MD Consulting Physician  CARDIOLOGY              Discharge Plan:   Discharge Condition: Good    Current Discharge Medication List    New Orders    !! ALPRAZolam 0.25 MG Oral Tab  Take 1 tab

## 2018-10-02 NOTE — CM/SW NOTE
ADDENDUM 1:43PM    STACEY informed by RN that pt is medically stable for discharge today at 3:00pm. STACEY spoke with [facility] from [facility] [phone] who confirmed that they are able to accept pt at that time.  STACEY spoke with pt's son Daysi Krishnan [phone] who state

## 2018-10-03 LAB
BNP: 435 PMOL/L
BUN: 22 MG/DL
BUN: 23 MG/DL
BUN: 23 MG/DL
CALCIUM: 8.5 MG/DL
CALCIUM: 8.7 MG/DL
CALCIUM: 8.8 MG/DL
CHLORIDE: 101 MEQ/L
CHLORIDE: 106 MEQ/L
CHLORIDE: 108 MEQ/L
CREATININE, SERUM: 0.91 MG/DL
CREATININE, SERUM: 1 MG/DL
CREATININE, SERUM: 1.13 MG/DL
GLUCOSE: 105 MG/DL
GLUCOSE: 108 MG/DL
GLUCOSE: 109 MG/DL
HEMATOCRIT: 34.6 %
HEMATOCRIT: 35.2 %
HEMOGLOBIN: 11.2 G/DL
HEMOGLOBIN: 11.4 G/DL
MAGNESIUM: 1.9 MG/DL
PLATELETS: 119 K/UL
PLATELETS: 127 K/UL
POTASSIUM, SERUM: 3.6 MEQ/L
POTASSIUM, SERUM: 3.9 MEQ/L
POTASSIUM, SERUM: 4.2 MEQ/L
POTASSIUM, SERUM: 4.2 MEQ/L
RED BLOOD COUNT: 3.68 X 10-6/U
RED BLOOD COUNT: 3.71 X 10-6/U
SODIUM: 136 MEQ/L
SODIUM: 140 MEQ/L
SODIUM: 141 MEQ/L
WHITE BLOOD COUNT: 2.7 X 10-3/U
WHITE BLOOD COUNT: 3 X 10-3/U

## 2018-10-10 ENCOUNTER — TELEPHONE (OUTPATIENT)
Dept: INTERNAL MEDICINE CLINIC | Facility: CLINIC | Age: 83
End: 2018-10-10

## 2018-10-10 NOTE — TELEPHONE ENCOUNTER
Son inquiring whether patient received the flu shot at last visit. Informed son she was supposed to get the Prevnar 13 and Flu, but since she got sent to the hospital, the patient did not receive any type of vaccinations at her last visit.   He verbalize

## 2018-10-17 ENCOUNTER — OFFICE VISIT (OUTPATIENT)
Dept: CARDIOLOGY CLINIC | Facility: HOSPITAL | Age: 83
End: 2018-10-17
Attending: INTERNAL MEDICINE
Payer: MEDICARE

## 2018-10-17 VITALS
OXYGEN SATURATION: 98 % | WEIGHT: 115 LBS | SYSTOLIC BLOOD PRESSURE: 103 MMHG | BODY MASS INDEX: 20 KG/M2 | HEART RATE: 79 BPM | DIASTOLIC BLOOD PRESSURE: 62 MMHG

## 2018-10-17 DIAGNOSIS — I51.89 DIASTOLIC DYSFUNCTION: Primary | ICD-10-CM

## 2018-10-17 DIAGNOSIS — M54.17 L-S RADICULOPATHY: ICD-10-CM

## 2018-10-17 DIAGNOSIS — I48.20 CHRONIC ATRIAL FIBRILLATION (HCC): ICD-10-CM

## 2018-10-17 PROCEDURE — 99212 OFFICE O/P EST SF 10 MIN: CPT | Performed by: NURSE PRACTITIONER

## 2018-10-17 PROCEDURE — 99214 OFFICE O/P EST MOD 30 MIN: CPT | Performed by: NURSE PRACTITIONER

## 2018-10-17 PROCEDURE — 36415 COLL VENOUS BLD VENIPUNCTURE: CPT | Performed by: NURSE PRACTITIONER

## 2018-10-17 RX ORDER — METOPROLOL SUCCINATE 25 MG/1
TABLET, EXTENDED RELEASE ORAL
Qty: 30 TABLET | Refills: 0 | Status: SHIPPED | OUTPATIENT
Start: 2018-10-17 | End: 2019-01-01

## 2018-10-17 RX ORDER — FUROSEMIDE 20 MG/1
TABLET ORAL
Qty: 60 TABLET | Refills: 0 | Status: SHIPPED | OUTPATIENT
Start: 2018-10-17 | End: 2018-10-17 | Stop reason: DRUGHIGH

## 2018-10-17 RX ORDER — MEMANTINE HYDROCHLORIDE 5 MG/1
5 TABLET ORAL 2 TIMES DAILY
Status: ON HOLD | COMMUNITY
End: 2019-01-01

## 2018-10-17 NOTE — PATIENT INSTRUCTIONS
Continue current medications    Return to [address] [place] [address] on 11/1/18    Follow up with Dr. Dana Mcdaniel ([facility]) next week    Follow up with Dr. Ferny Galarza once discharged from Rehab    Please call the heart failure clinic if you notice a weight gain of

## 2018-10-17 NOTE — PROGRESS NOTES
[address] [facility] Patient Status:  No patient class for patient encounter    1925 MRN [id number]   Location MD Dr. Marisela Wang is a 80 year ol 10/02/2018 05:44 AM    K 4.2 10/02/2018 05:44 AM     10/02/2018 05:44 AM    CO2 29 10/02/2018 05:44 AM     (H) 10/02/2018 05:44 AM    CA 8.8 10/02/2018 05:44 AM    ALB 4.0 09/26/2018 04:40 PM    ALKPHO 73 09/26/2018 04:40 PM    BILT 1.3 (H) 09 25 mg qd, Lasix 40 mg qd, spironolactone 12.5 mg qd  -Blood work reviewed from 10/17/18; Renal function stable, BUN/Cr 31/0.94, K 4.3  -Continue current medications  -Strict I/Os, daily weights  -Repoet given to Clifton Green, RN at [employer].  States patient is be

## 2018-10-25 ENCOUNTER — TELEPHONE (OUTPATIENT)
Dept: INTERNAL MEDICINE CLINIC | Facility: CLINIC | Age: 83
End: 2018-10-25

## 2018-10-25 NOTE — TELEPHONE ENCOUNTER
Fidel Simpson from [facility] called and wanted to confirm that the patient is under Dr. Trish Guadalupe care. They were referred to the patient by the rehab place the patient was at ([facility]).   They will be sending communication regarding care via fax to the

## 2018-10-26 ENCOUNTER — OFFICE VISIT (OUTPATIENT)
Dept: INTERNAL MEDICINE CLINIC | Facility: CLINIC | Age: 83
End: 2018-10-26
Payer: MEDICARE

## 2018-10-26 VITALS
OXYGEN SATURATION: 98 % | SYSTOLIC BLOOD PRESSURE: 110 MMHG | TEMPERATURE: 98 F | HEART RATE: 83 BPM | RESPIRATION RATE: 16 BRPM | DIASTOLIC BLOOD PRESSURE: 46 MMHG | BODY MASS INDEX: 19 KG/M2 | WEIGHT: 110 LBS

## 2018-10-26 DIAGNOSIS — Z79.899 ENCOUNTER FOR MONITORING DIURETIC THERAPY: ICD-10-CM

## 2018-10-26 DIAGNOSIS — Z51.81 ENCOUNTER FOR MONITORING DIURETIC THERAPY: ICD-10-CM

## 2018-10-26 DIAGNOSIS — I10 ESSENTIAL HYPERTENSION: ICD-10-CM

## 2018-10-26 DIAGNOSIS — I50.9 ACUTE ON CHRONIC CONGESTIVE HEART FAILURE, UNSPECIFIED HEART FAILURE TYPE (HCC): Primary | ICD-10-CM

## 2018-10-26 DIAGNOSIS — M05.79 RHEUMATOID ARTHRITIS INVOLVING MULTIPLE SITES WITH POSITIVE RHEUMATOID FACTOR (HCC): ICD-10-CM

## 2018-10-26 DIAGNOSIS — I38 VALVULAR REGURGITATION: ICD-10-CM

## 2018-10-26 DIAGNOSIS — F41.9 ACUTE ANXIETY: ICD-10-CM

## 2018-10-26 DIAGNOSIS — F02.80 DEMENTIA DUE TO ALZHEIMER'S DISEASE (HCC): ICD-10-CM

## 2018-10-26 DIAGNOSIS — G30.9 DEMENTIA DUE TO ALZHEIMER'S DISEASE (HCC): ICD-10-CM

## 2018-10-26 DIAGNOSIS — I48.0 PAROXYSMAL ATRIAL FIBRILLATION (HCC): ICD-10-CM

## 2018-10-26 DIAGNOSIS — D61.818 PANCYTOPENIA (HCC): ICD-10-CM

## 2018-10-26 PROCEDURE — 99214 OFFICE O/P EST MOD 30 MIN: CPT | Performed by: INTERNAL MEDICINE

## 2018-10-26 RX ORDER — ALPRAZOLAM 0.5 MG/1
0.5 TABLET ORAL NIGHTLY PRN
Status: ON HOLD | COMMUNITY
End: 2019-01-01

## 2018-10-26 NOTE — PATIENT INSTRUCTIONS
Decrease Furosemide to 40 mg on Mon., Wed., and Friday and Sat and 20 mg other 3 days    Take 1/2 alprazolam in daytime instead of 1    May take 10 mg Namenda daily

## 2018-10-26 NOTE — PROGRESS NOTES
Brooks Gallegos is a 80year old female. Patient presents with: Follow - Up: pt presents to clinic for follow up from Rehab       HPI:        Ritesh Elaine is now living home with a full-time caretaker, after  getting rehab at [facility].   She comes to mouth daily.  Disp:  Rfl:         History:  Past Medical History:   Diagnosis Date   • Anxiety state, unspecified    • Arrhythmia     A fib   • Arthritis     RA   • Atrial fibrillation (HCC)    • CHF (congestive heart failure) (HCC)    • Congestive heart di pink, sclerae anicteric, oral mucosa and pharynx normal.  Face is not swollen, as was on initial visit. Neck-supple, no carotid bruits, no adenopathy. Lungs-clear to auscultation   Heart-S1-S2 normal, no S3, Gr 2/6 syst murmur.   Rhythm irregularly irreg BNP (B TYPE NATRIUERTIC PEPTIDE)   Result Value Ref Range    Beta Natriuretic Peptide 435 (H) 0 - 100 pg/mL   TSH W REFLEX TO FREE T4   Result Value Ref Range    TSH 2.93 0.45 - 5.33 uIU/mL   LIPID PANEL   Result Value Ref Range    HDL Cholesterol 45 mg/ Ratio 25.3 (H) 10.0 - 20.0    Anion Gap 7 0 - 18 mmol/L    Calculated Osmolality 294 275 - 295 mOsm/kg    GFR, Non- 55 (L) >=60    GFR, -American >60 >=60   MAGNESIUM   Result Value Ref Range    Magnesium 1.9 1.8 - 2.5 mg/dL   BASIC 7.4 - 10.3 fL    Neutrophil % 70 %    Lymphocyte % 14 %    Monocyte % 12 %    Eosinophil % 4 %    Basophil % 1 %    Neutrophil Absolute 2.1 1.8 - 7.7 K/UL    Lymphocyte Absolute 0.4 (L) 1.0 - 4.0 K/UL    Monocyte Absolute 0.4 0.0 - 1.0 K/UL    Eosinophil A (cpt=71046)    Result Date: 9/27/2018  PROCEDURE: XR CHEST PA + LAT CHEST (CPT=71020)  COMPARISON: [facility], X CHEST PA LAT ROUTINE, 2/19/2016, 7:31. INDICATIONS: CHF, follow up. TECHNIQUE:   Two views.    FINDINGS: CARDIAC/VASC: The hea Encounter for monitoring diuretic therapy  Plan: BASIC METABOLIC PANEL (8), MAGNESIUM            (I48.0) Paroxysmal atrial fibrillation (HCC)  Plan: DIETITIAN EDUCATION INITIAL, DIET (INTERNAL)        On aspirin, rate controlled    (E59.492) Pancytopenia (

## 2018-11-15 ENCOUNTER — OFFICE VISIT (OUTPATIENT)
Dept: INTERNAL MEDICINE CLINIC | Facility: CLINIC | Age: 83
End: 2018-11-15
Payer: MEDICARE

## 2018-11-15 VITALS
OXYGEN SATURATION: 98 % | WEIGHT: 113 LBS | SYSTOLIC BLOOD PRESSURE: 86 MMHG | TEMPERATURE: 98 F | RESPIRATION RATE: 16 BRPM | HEIGHT: 61.5 IN | DIASTOLIC BLOOD PRESSURE: 50 MMHG | BODY MASS INDEX: 21.06 KG/M2 | HEART RATE: 68 BPM

## 2018-11-15 DIAGNOSIS — K52.9 CHRONIC DIARRHEA: ICD-10-CM

## 2018-11-15 DIAGNOSIS — M05.79 RHEUMATOID ARTHRITIS INVOLVING MULTIPLE SITES WITH POSITIVE RHEUMATOID FACTOR (HCC): ICD-10-CM

## 2018-11-15 DIAGNOSIS — G30.9 DEMENTIA DUE TO ALZHEIMER'S DISEASE (HCC): ICD-10-CM

## 2018-11-15 DIAGNOSIS — I10 ESSENTIAL HYPERTENSION: ICD-10-CM

## 2018-11-15 DIAGNOSIS — I38 VALVULAR REGURGITATION: ICD-10-CM

## 2018-11-15 DIAGNOSIS — F02.80 DEMENTIA DUE TO ALZHEIMER'S DISEASE (HCC): ICD-10-CM

## 2018-11-15 DIAGNOSIS — I48.0 PAROXYSMAL ATRIAL FIBRILLATION (HCC): ICD-10-CM

## 2018-11-15 DIAGNOSIS — I50.9 ACUTE ON CHRONIC CONGESTIVE HEART FAILURE, UNSPECIFIED HEART FAILURE TYPE (HCC): Primary | ICD-10-CM

## 2018-11-15 PROCEDURE — G0439 PPPS, SUBSEQ VISIT: HCPCS | Performed by: INTERNAL MEDICINE

## 2018-11-15 NOTE — PROGRESS NOTES
HPI:   Brooks Gallegos is a 80year old female who presents for a MA Supervisit.     Patient Active Problem List:     CHF (congestive heart failure) (HCC)     Neuropathy     A-fib (HCC)     Anemia     Osteoarthritis     Atrial fibrillation (HCC)     Anx help    Eating: Need some help    Driving: Cannot do without help    Preparing your meals: Need some help    Managing money/bills: Cannot do without help    Taking medications as prescribed: Need some help    Are you able to afford your medications?: Yes needed for Anxiety. (Patient taking differently: Take 0.25 mg by mouth 3 (three) times daily as needed for Anxiety.  ) Disp: 90 tablet Rfl: 0   furosemide 40 MG Oral Tab Take 1 tablet (40 mg total) by mouth daily.  (Patient taking differently: Take 40 mg by HYSTERECTOMY     • OTHER SURGICAL HISTORY  40 yrs ago    lumbosacral surgery      Family History   Problem Relation Age of Onset   • Heart Disease Mother 72        CAD      SOCIAL HISTORY:   Social History    Tobacco Use      Smoking status: Never Smoker Acuity: Corrected Left Eye Visual Acuity: Corrected   Right Eye Chart Acuity: 20/30 Left Eye Chart Acuity: 20/40   NECK: supple, no adenopathy, no bruits  CHEST: no chest tenderness  BREAST: no masses, no discharge or no axillary lymphadenopathy   LUNGS: c Refer to GI.    (I48.0) Paroxysmal atrial fibrillation (HCC)  Plan: Heart rate controlled. Continue present treatment    (I38) Valvular regurgitation  Plan: Follow-up with cardiology    (I10) Essential hypertension  Plan: Recently with low blood pressure. Pelvic      Pap  Annually if high risk There are no preventive care reminders to display for this patient. Update Health Maintenance if applicable    Pap  Every two years There are no preventive care reminders to display for this patient.  Update [address] [facility] this or any previous visit. No flowsheet data found.       SCREENING SCHEDULE - FEMALE      SCREEN COVERAGE SCHEDULE  (If Indicated) LAST DONE   Dexa If at Risk q2 years  not indicated   Lipids All Patients q5 years LDL Cholesterol (mg/dL)   Date Value   09

## 2018-12-11 ENCOUNTER — TELEPHONE (OUTPATIENT)
Dept: INTERNAL MEDICINE CLINIC | Facility: CLINIC | Age: 83
End: 2018-12-11

## 2018-12-27 NOTE — PROGRESS NOTES
Hoda Bazzi is a 80year old female. Patient presents with: Follow - Up: pt presents to clinic for 6 week follow up       HPI:       Patient lives at her home with a full-time caretaker.   Son asks if I received patient's psychological evaluation, times daily.  Disp:  Rfl:         History:  Past Medical History:   Diagnosis Date   • Anxiety state, unspecified    • Arrhythmia     A fib   • Arthritis     RA   • Atrial fibrillation (HCC)    • CHF (congestive heart failure) (HCC)    • Congestive heart di Weight: 110 lb   Height: 61.5\"     Body mass index is 20.45 kg/m². Patient is alert, orientated, in no acute distress  HEENT- extraocular muscles intact. Conjunctive pink, sclerae anicteric.   Pharynx clear  Neck-supple, no carotid bruits, no adenopath Namenda.    (I10) Essential hypertension  Plan:Controlled    (M05.79) Rheumatoid arthritis involving multiple sites with positive rheumatoid factor (HCC)  Plan: No acute erythema or heat. No complaints of pain.                 Imaging & Consults:  None

## 2018-12-31 NOTE — TELEPHONE ENCOUNTER
Patient's son Alfonso Rea. Continue 40 mg 3 days weekly and 20 mg other days. However, advised him to watch if any signs of dizziness or excessive dehydration, will decrease diuretic.   Visit, patient seem to be doing very well with present doses and did not ha

## 2019-01-01 ENCOUNTER — TELEPHONE (OUTPATIENT)
Dept: INTERNAL MEDICINE CLINIC | Facility: CLINIC | Age: 84
End: 2019-01-01

## 2019-01-01 ENCOUNTER — LAB REQUISITION (OUTPATIENT)
Dept: LAB | Facility: HOSPITAL | Age: 84
End: 2019-01-01
Payer: MEDICARE

## 2019-01-01 ENCOUNTER — PATIENT OUTREACH (OUTPATIENT)
Dept: CASE MANAGEMENT | Age: 84
End: 2019-01-01

## 2019-01-01 ENCOUNTER — OFFICE VISIT (OUTPATIENT)
Dept: INTERNAL MEDICINE CLINIC | Facility: CLINIC | Age: 84
End: 2019-01-01
Payer: MEDICARE

## 2019-01-01 ENCOUNTER — HOSPITAL ENCOUNTER (INPATIENT)
Facility: HOSPITAL | Age: 84
LOS: 1 days | DRG: 189 | End: 2019-01-01
Attending: HOSPITALIST | Admitting: HOSPITALIST
Payer: OTHER MISCELLANEOUS

## 2019-01-01 ENCOUNTER — HOSPITAL ENCOUNTER (INPATIENT)
Facility: HOSPITAL | Age: 84
LOS: 2 days | Discharge: HOME HEALTH | DRG: 291 | End: 2019-01-01
Attending: HOSPITALIST | Admitting: HOSPITALIST
Payer: MEDICARE

## 2019-01-01 ENCOUNTER — APPOINTMENT (OUTPATIENT)
Dept: GENERAL RADIOLOGY | Facility: HOSPITAL | Age: 84
DRG: 292 | End: 2019-01-01
Attending: EMERGENCY MEDICINE
Payer: MEDICARE

## 2019-01-01 ENCOUNTER — TELEPHONE (OUTPATIENT)
Dept: CARDIOLOGY | Age: 84
End: 2019-01-01

## 2019-01-01 ENCOUNTER — CLINICAL ABSTRACT (OUTPATIENT)
Dept: CARDIOLOGY | Age: 84
End: 2019-01-01

## 2019-01-01 ENCOUNTER — HOSPITAL ENCOUNTER (INPATIENT)
Facility: HOSPITAL | Age: 84
LOS: 2 days | Discharge: HOME HEALTH | DRG: 689 | End: 2019-01-01
Attending: EMERGENCY MEDICINE | Admitting: HOSPITALIST
Payer: MEDICARE

## 2019-01-01 ENCOUNTER — TELEPHONE (OUTPATIENT)
Dept: CARDIOLOGY UNIT | Facility: HOSPITAL | Age: 84
End: 2019-01-01

## 2019-01-01 ENCOUNTER — APPOINTMENT (OUTPATIENT)
Dept: LAB | Facility: HOSPITAL | Age: 84
End: 2019-01-01
Attending: INTERNAL MEDICINE
Payer: MEDICARE

## 2019-01-01 ENCOUNTER — TELEPHONE (OUTPATIENT)
Dept: INTERNAL MEDICINE UNIT | Facility: HOSPITAL | Age: 84
End: 2019-01-01

## 2019-01-01 ENCOUNTER — APPOINTMENT (OUTPATIENT)
Dept: GENERAL RADIOLOGY | Facility: HOSPITAL | Age: 84
DRG: 292 | End: 2019-01-01
Attending: NURSE PRACTITIONER
Payer: MEDICARE

## 2019-01-01 ENCOUNTER — HOSPITAL ENCOUNTER (INPATIENT)
Facility: HOSPITAL | Age: 84
LOS: 1 days | Discharge: HOSPICE | DRG: 189 | End: 2019-01-01
Attending: EMERGENCY MEDICINE | Admitting: HOSPITALIST
Payer: MEDICARE

## 2019-01-01 ENCOUNTER — APPOINTMENT (OUTPATIENT)
Dept: GENERAL RADIOLOGY | Facility: HOSPITAL | Age: 84
DRG: 189 | End: 2019-01-01
Payer: MEDICARE

## 2019-01-01 ENCOUNTER — HOSPITAL ENCOUNTER (INPATIENT)
Facility: HOSPITAL | Age: 84
LOS: 5 days | Discharge: NURSING, REHAB OR LONG-TERM CARE | DRG: 292 | End: 2019-01-01
Attending: EMERGENCY MEDICINE | Admitting: HOSPITALIST
Payer: MEDICARE

## 2019-01-01 ENCOUNTER — APPOINTMENT (OUTPATIENT)
Dept: GENERAL RADIOLOGY | Facility: HOSPITAL | Age: 84
DRG: 291 | End: 2019-01-01
Attending: HOSPITALIST
Payer: MEDICARE

## 2019-01-01 ENCOUNTER — APPOINTMENT (OUTPATIENT)
Dept: CV DIAGNOSTICS | Facility: HOSPITAL | Age: 84
DRG: 291 | End: 2019-01-01
Attending: HOSPITALIST
Payer: MEDICARE

## 2019-01-01 ENCOUNTER — APPOINTMENT (OUTPATIENT)
Dept: CARDIOLOGY | Age: 84
End: 2019-01-01

## 2019-01-01 ENCOUNTER — LAB REQUISITION (OUTPATIENT)
Dept: LAB | Age: 84
End: 2019-01-01
Payer: MEDICARE

## 2019-01-01 ENCOUNTER — APPOINTMENT (OUTPATIENT)
Dept: ULTRASOUND IMAGING | Facility: HOSPITAL | Age: 84
DRG: 689 | End: 2019-01-01
Attending: HOSPITALIST
Payer: MEDICARE

## 2019-01-01 ENCOUNTER — HOSPITAL ENCOUNTER (OUTPATIENT)
Dept: GENERAL RADIOLOGY | Facility: HOSPITAL | Age: 84
Discharge: HOME | End: 2019-01-01
Attending: INTERNAL MEDICINE
Payer: MEDICARE

## 2019-01-01 ENCOUNTER — APPOINTMENT (OUTPATIENT)
Dept: GENERAL RADIOLOGY | Facility: HOSPITAL | Age: 84
DRG: 292 | End: 2019-01-01
Attending: HOSPITALIST
Payer: MEDICARE

## 2019-01-01 VITALS
HEART RATE: 53 BPM | SYSTOLIC BLOOD PRESSURE: 110 MMHG | OXYGEN SATURATION: 96 % | TEMPERATURE: 98 F | DIASTOLIC BLOOD PRESSURE: 60 MMHG | TEMPERATURE: 98 F | HEIGHT: 61.5 IN | SYSTOLIC BLOOD PRESSURE: 112 MMHG | HEIGHT: 61.5 IN | OXYGEN SATURATION: 98 % | BODY MASS INDEX: 20.87 KG/M2 | RESPIRATION RATE: 20 BRPM | RESPIRATION RATE: 17 BRPM | HEART RATE: 61 BPM | WEIGHT: 112 LBS | WEIGHT: 107 LBS | BODY MASS INDEX: 19.94 KG/M2 | DIASTOLIC BLOOD PRESSURE: 60 MMHG

## 2019-01-01 VITALS
HEART RATE: 63 BPM | OXYGEN SATURATION: 98 % | SYSTOLIC BLOOD PRESSURE: 116 MMHG | RESPIRATION RATE: 18 BRPM | TEMPERATURE: 98 F | DIASTOLIC BLOOD PRESSURE: 60 MMHG | HEIGHT: 64 IN | WEIGHT: 115.06 LBS | BODY MASS INDEX: 19.64 KG/M2

## 2019-01-01 VITALS
WEIGHT: 121.88 LBS | TEMPERATURE: 98 F | OXYGEN SATURATION: 95 % | RESPIRATION RATE: 21 BRPM | SYSTOLIC BLOOD PRESSURE: 133 MMHG | BODY MASS INDEX: 23 KG/M2 | HEART RATE: 67 BPM | DIASTOLIC BLOOD PRESSURE: 91 MMHG

## 2019-01-01 VITALS
RESPIRATION RATE: 16 BRPM | WEIGHT: 112 LBS | DIASTOLIC BLOOD PRESSURE: 52 MMHG | HEIGHT: 61.5 IN | TEMPERATURE: 98 F | SYSTOLIC BLOOD PRESSURE: 104 MMHG | HEART RATE: 80 BPM | OXYGEN SATURATION: 97 % | BODY MASS INDEX: 20.87 KG/M2

## 2019-01-01 VITALS
OXYGEN SATURATION: 97 % | SYSTOLIC BLOOD PRESSURE: 110 MMHG | HEART RATE: 76 BPM | HEIGHT: 61.5 IN | DIASTOLIC BLOOD PRESSURE: 60 MMHG | RESPIRATION RATE: 17 BRPM | BODY MASS INDEX: 21.25 KG/M2 | WEIGHT: 114 LBS | TEMPERATURE: 98 F

## 2019-01-01 VITALS
HEIGHT: 61.5 IN | HEART RATE: 59 BPM | SYSTOLIC BLOOD PRESSURE: 130 MMHG | DIASTOLIC BLOOD PRESSURE: 70 MMHG | OXYGEN SATURATION: 98 % | WEIGHT: 116 LBS | TEMPERATURE: 97 F | BODY MASS INDEX: 21.62 KG/M2 | RESPIRATION RATE: 15 BRPM

## 2019-01-01 VITALS
OXYGEN SATURATION: 95 % | TEMPERATURE: 98 F | WEIGHT: 112 LBS | RESPIRATION RATE: 18 BRPM | HEART RATE: 75 BPM | SYSTOLIC BLOOD PRESSURE: 118 MMHG | DIASTOLIC BLOOD PRESSURE: 57 MMHG | BODY MASS INDEX: 21.14 KG/M2 | HEIGHT: 61 IN

## 2019-01-01 VITALS
HEART RATE: 87 BPM | OXYGEN SATURATION: 96 % | HEIGHT: 61.5 IN | BODY MASS INDEX: 19.01 KG/M2 | WEIGHT: 102 LBS | TEMPERATURE: 98 F | SYSTOLIC BLOOD PRESSURE: 110 MMHG | RESPIRATION RATE: 17 BRPM | DIASTOLIC BLOOD PRESSURE: 54 MMHG

## 2019-01-01 VITALS
BODY MASS INDEX: 20.55 KG/M2 | WEIGHT: 116 LBS | RESPIRATION RATE: 16 BRPM | HEART RATE: 88 BPM | SYSTOLIC BLOOD PRESSURE: 122 MMHG | DIASTOLIC BLOOD PRESSURE: 70 MMHG | HEIGHT: 63 IN

## 2019-01-01 VITALS
HEIGHT: 61.5 IN | RESPIRATION RATE: 17 BRPM | HEART RATE: 67 BPM | OXYGEN SATURATION: 98 % | SYSTOLIC BLOOD PRESSURE: 130 MMHG | TEMPERATURE: 97 F | WEIGHT: 127 LBS | DIASTOLIC BLOOD PRESSURE: 70 MMHG | BODY MASS INDEX: 23.67 KG/M2

## 2019-01-01 VITALS
RESPIRATION RATE: 21 BRPM | DIASTOLIC BLOOD PRESSURE: 58 MMHG | HEART RATE: 81 BPM | TEMPERATURE: 97 F | SYSTOLIC BLOOD PRESSURE: 97 MMHG | OXYGEN SATURATION: 98 %

## 2019-01-01 VITALS
BODY MASS INDEX: 16.4 KG/M2 | TEMPERATURE: 97 F | OXYGEN SATURATION: 98 % | HEART RATE: 74 BPM | SYSTOLIC BLOOD PRESSURE: 100 MMHG | HEIGHT: 61.5 IN | RESPIRATION RATE: 16 BRPM | WEIGHT: 88 LBS | DIASTOLIC BLOOD PRESSURE: 60 MMHG

## 2019-01-01 VITALS
RESPIRATION RATE: 24 BRPM | BODY MASS INDEX: 16.3 KG/M2 | TEMPERATURE: 98 F | HEIGHT: 61 IN | SYSTOLIC BLOOD PRESSURE: 148 MMHG | OXYGEN SATURATION: 100 % | DIASTOLIC BLOOD PRESSURE: 62 MMHG | HEART RATE: 83 BPM | WEIGHT: 86.31 LBS

## 2019-01-01 DIAGNOSIS — Z79.899 OTHER LONG TERM (CURRENT) DRUG THERAPY: ICD-10-CM

## 2019-01-01 DIAGNOSIS — F02.81 LATE ONSET ALZHEIMER'S DISEASE WITH BEHAVIORAL DISTURBANCE (HCC): ICD-10-CM

## 2019-01-01 DIAGNOSIS — R41.3 MEMORY DISORDER: ICD-10-CM

## 2019-01-01 DIAGNOSIS — Z87.440 PERSONAL HISTORY OF URINARY (TRACT) INFECTIONS: ICD-10-CM

## 2019-01-01 DIAGNOSIS — I38 VALVULAR REGURGITATION: ICD-10-CM

## 2019-01-01 DIAGNOSIS — I50.23 ACUTE ON CHRONIC SYSTOLIC CONGESTIVE HEART FAILURE (HCC): Primary | ICD-10-CM

## 2019-01-01 DIAGNOSIS — I50.9 PLEURAL EFFUSION DUE TO CHF (CONGESTIVE HEART FAILURE) (HCC): ICD-10-CM

## 2019-01-01 DIAGNOSIS — M05.79 RHEUMATOID ARTHRITIS INVOLVING MULTIPLE SITES WITH POSITIVE RHEUMATOID FACTOR (HCC): ICD-10-CM

## 2019-01-01 DIAGNOSIS — M54.17 L-S RADICULOPATHY: ICD-10-CM

## 2019-01-01 DIAGNOSIS — I50.32 CHRONIC DIASTOLIC CONGESTIVE HEART FAILURE, NYHA CLASS 2 (HCC): ICD-10-CM

## 2019-01-01 DIAGNOSIS — I37.1 NONRHEUMATIC PULMONARY VALVE INSUFFICIENCY: ICD-10-CM

## 2019-01-01 DIAGNOSIS — M48.02 SPINAL STENOSIS IN CERVICAL REGION: ICD-10-CM

## 2019-01-01 DIAGNOSIS — I50.33 ACUTE ON CHRONIC DIASTOLIC CONGESTIVE HEART FAILURE (HCC): ICD-10-CM

## 2019-01-01 DIAGNOSIS — N30.01 ACUTE CYSTITIS WITH HEMATURIA: Primary | ICD-10-CM

## 2019-01-01 DIAGNOSIS — N30.01 ACUTE CYSTITIS WITH HEMATURIA: ICD-10-CM

## 2019-01-01 DIAGNOSIS — E11.40 TYPE 2 DIABETES MELLITUS WITH DIABETIC NEUROPATHY, UNSPECIFIED (HCC): ICD-10-CM

## 2019-01-01 DIAGNOSIS — I11.0 HYPERTENSIVE HEART DISEASE WITH HEART FAILURE (HCC): ICD-10-CM

## 2019-01-01 DIAGNOSIS — D69.6 THROMBOCYTOPENIA (HCC): ICD-10-CM

## 2019-01-01 DIAGNOSIS — Z02.9 ENCOUNTERS FOR UNSPECIFIED ADMINISTRATIVE PURPOSE: ICD-10-CM
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
## 2019-01-01 DIAGNOSIS — I27.20 PULMONARY HYPERTENSION (HCC): ICD-10-CM

## 2019-01-01 DIAGNOSIS — Z51.81 ENCOUNTER FOR MONITORING DIURETIC THERAPY: ICD-10-CM

## 2019-01-01 DIAGNOSIS — I48.0 PAROXYSMAL ATRIAL FIBRILLATION (HCC): ICD-10-CM

## 2019-01-01 DIAGNOSIS — I10 ESSENTIAL HYPERTENSION: ICD-10-CM

## 2019-01-01 DIAGNOSIS — G30.1 LATE ONSET ALZHEIMER'S DISEASE WITH BEHAVIORAL DISTURBANCE (HCC): ICD-10-CM

## 2019-01-01 DIAGNOSIS — I48.20 CHRONIC ATRIAL FIBRILLATION (HCC): ICD-10-CM
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
## 2019-01-01 DIAGNOSIS — L89.311 PRESSURE INJURY OF RIGHT BUTTOCK, STAGE 1: Primary | ICD-10-CM

## 2019-01-01 DIAGNOSIS — Z79.899 ENCOUNTER FOR MONITORING DIURETIC THERAPY: ICD-10-CM

## 2019-01-01 DIAGNOSIS — Z71.89 GOALS OF CARE, COUNSELING/DISCUSSION: ICD-10-CM

## 2019-01-01 DIAGNOSIS — K52.9 CHRONIC DIARRHEA: Primary | ICD-10-CM

## 2019-01-01 DIAGNOSIS — R60.1 ANASARCA: ICD-10-CM

## 2019-01-01 DIAGNOSIS — D50.0 IRON DEFICIENCY ANEMIA DUE TO CHRONIC BLOOD LOSS: ICD-10-CM

## 2019-01-01 DIAGNOSIS — G93.41 ACUTE METABOLIC ENCEPHALOPATHY: ICD-10-CM

## 2019-01-01 DIAGNOSIS — I50.9 ACUTE ON CHRONIC CONGESTIVE HEART FAILURE, UNSPECIFIED HEART FAILURE TYPE (HCC): Primary | ICD-10-CM

## 2019-01-01 DIAGNOSIS — J90 CHRONIC BILATERAL PLEURAL EFFUSIONS: ICD-10-CM

## 2019-01-01 DIAGNOSIS — I50.32 CHRONIC DIASTOLIC CONGESTIVE HEART FAILURE (HCC): ICD-10-CM

## 2019-01-01 DIAGNOSIS — I13.0 HYPERTENSIVE HEART AND CHRONIC KIDNEY DISEASE WITH HEART FAILURE AND STAGE 1 THROUGH STAGE 4 CHRONIC KIDNEY DISEASE, OR CHRONIC KIDNEY DISEASE (HCC): ICD-10-CM

## 2019-01-01 DIAGNOSIS — I10 HYPERTENSION, WELL CONTROLLED: ICD-10-CM

## 2019-01-01 DIAGNOSIS — I50.33 ACUTE ON CHRONIC DIASTOLIC (CONGESTIVE) HEART FAILURE (HCC): Primary | ICD-10-CM

## 2019-01-01 DIAGNOSIS — N28.9 ACUTE RENAL INSUFFICIENCY: ICD-10-CM

## 2019-01-01 DIAGNOSIS — N17.9 ACUTE KIDNEY INJURY (HCC): ICD-10-CM

## 2019-01-01 DIAGNOSIS — L89.152 PRESSURE INJURY OF COCCYGEAL REGION, STAGE 2 (HCC): ICD-10-CM

## 2019-01-01 DIAGNOSIS — R06.00 DYSPNEA, UNSPECIFIED TYPE: ICD-10-CM

## 2019-01-01 DIAGNOSIS — I50.43 ACUTE ON CHRONIC COMBINED SYSTOLIC (CONGESTIVE) AND DIASTOLIC (CONGESTIVE) HEART FAILURE (HCC): ICD-10-CM

## 2019-01-01 DIAGNOSIS — R30.0 DYSURIA: Primary | ICD-10-CM

## 2019-01-01 DIAGNOSIS — Z51.5 PALLIATIVE CARE ENCOUNTER: ICD-10-CM

## 2019-01-01 DIAGNOSIS — D50.8 OTHER IRON DEFICIENCY ANEMIA: ICD-10-CM

## 2019-01-01 DIAGNOSIS — I27.20 PULMONARY HYPERTENSION, UNSPECIFIED (HCC): ICD-10-CM

## 2019-01-01 DIAGNOSIS — I50.32 CHRONIC DIASTOLIC CONGESTIVE HEART FAILURE (HCC): Primary | ICD-10-CM

## 2019-01-01 DIAGNOSIS — S41.111A SKIN TEAR OF RIGHT UPPER ARM WITHOUT COMPLICATION, INITIAL ENCOUNTER: ICD-10-CM

## 2019-01-01 DIAGNOSIS — R30.0 DYSURIA: ICD-10-CM

## 2019-01-01 DIAGNOSIS — R41.0 DELIRIUM, ACUTE: ICD-10-CM

## 2019-01-01 DIAGNOSIS — L89.151 DECUBITUS ULCER OF COCCYX, STAGE I: ICD-10-CM

## 2019-01-01 DIAGNOSIS — L89.153 DECUBITUS ULCER OF COCCYX, STAGE III (HCC): ICD-10-CM

## 2019-01-01 LAB
ADENOVIRUS F 40/41 PCR: NEGATIVE
ANION GAP SERPL CALC-SCNC: 4 MMOL/L (ref 0–18)
ANION GAP SERPL CALC-SCNC: 7 MMOL/L (ref 0–18)
ANION GAP SERPL CALC-SCNC: NORMAL MMOL/L
ASTROVIRUS PCR: NEGATIVE
BACTERIA UR QL AUTO: NEGATIVE /HPF
BUN BLD-MCNC: 24 MG/DL (ref 7–18)
BUN BLD-MCNC: 31 MG/DL (ref 7–18)
BUN SERPL-MCNC: 36 MG/DL
BUN/CREAT SERPL: 17.6 (ref 10–20)
BUN/CREAT SERPL: 32.3 (ref 10–20)
BUN/CREAT SERPL: NORMAL
C CAYETANENSIS DNA SPEC QL NAA+PROBE: NEGATIVE
C. DIFFICILE TOXIN A/B PCR: NEGATIVE
CALCIUM BLD-MCNC: 8.4 MG/DL (ref 8.5–10.1)
CALCIUM BLD-MCNC: 9 MG/DL (ref 8.5–10.1)
CALCIUM SERPL-MCNC: 8.4 MG/DL
CAMPY SP DNA.DIARRHEA STL QL NAA+PROBE: NEGATIVE
CHLORIDE SERPL-SCNC: 103 MMOL/L
CHLORIDE SERPL-SCNC: 105 MMOL/L (ref 98–107)
CHLORIDE SERPL-SCNC: 105 MMOL/L (ref 98–112)
CO2 SERPL-SCNC: 26 MMOL/L (ref 21–32)
CO2 SERPL-SCNC: 31 MMOL/L (ref 21–32)
CO2 SERPL-SCNC: NORMAL MMOL/L
CREAT BLD-MCNC: 0.96 MG/DL (ref 0.55–1.02)
CREAT BLD-MCNC: 1.36 MG/DL (ref 0.55–1.02)
CREAT SERPL-MCNC: 1.05 MG/DL
CRYPTOSP DNA SPEC QL NAA+PROBE: NEGATIVE
EAEC PAA PLAS AGGR+AATA ST NAA+NON-PRB: NEGATIVE
EC STX1+STX2 + H7 FLIC SPEC NAA+PROBE: NEGATIVE
ENTAMOEBA HISTOLYTICA PCR: NEGATIVE
EPEC EAE GENE STL QL NAA+NON-PROBE: NEGATIVE
ETEC LTA+ST1A+ST1B TOX ST NAA+NON-PROBE: NEGATIVE
FOLATE SERPL-MCNC: >20 NG/ML (ref 8.7–?)
GIARDIA LAMBLIA PCR: NEGATIVE
GLUCOSE BLD-MCNC: 104 MG/DL (ref 70–99)
GLUCOSE BLD-MCNC: 106 MG/DL (ref 70–99)
GLUCOSE SERPL-MCNC: 94 MG/DL
HAV IGM SER QL: 2.4 MG/DL (ref 1.6–2.6)
HELICOBACTER PYLORI AG, FECAL: NEGATIVE
HGB BLD-MCNC: 10.9 G/DL (ref 12–16)
IRON SATURATION: 10 % (ref 15–50)
IRON SERPL-MCNC: 49 UG/DL (ref 50–170)
LENGTH OF FAST TIME PATIENT: NORMAL H
NOROVIRUS GI/GII PCR: NEGATIVE
OSMOLALITY SERPL CALC.SUM OF ELEC: 290 MOSM/KG (ref 275–295)
OSMOLALITY SERPL CALC.SUM OF ELEC: 297 MOSM/KG (ref 275–295)
P SHIGELLOIDES DNA STL QL NAA+PROBE: NEGATIVE
POTASSIUM SERPL-SCNC: 3.5 MMOL/L
POTASSIUM SERPL-SCNC: 3.6 MMOL/L (ref 3.5–5.1)
POTASSIUM SERPL-SCNC: 4.5 MMOL/L (ref 3.5–5.1)
RBC #/AREA URNS AUTO: 1 /HPF
ROTAVIRUS A PCR: NEGATIVE
SALMONELLA DNA SPEC QL NAA+PROBE: NEGATIVE
SAPOVIRUS PCR: NEGATIVE
SHIGELLA SP+EIEC IPAH ST NAA+NON-PROBE: NEGATIVE
SODIUM SERPL-SCNC: 138 MMOL/L (ref 136–145)
SODIUM SERPL-SCNC: 140 MMOL/L (ref 136–145)
SODIUM SERPL-SCNC: 141 MMOL/L
TOTAL IRON BINDING CAPACITY: 469 UG/DL (ref 240–450)
TRANSFERRIN SERPL-MCNC: 315 MG/DL (ref 200–360)
V CHOLERAE DNA SPEC QL NAA+PROBE: NEGATIVE
VIBRIO DNA SPEC NAA+PROBE: NEGATIVE
VIT B12 SERPL-MCNC: 635 PG/ML (ref 193–986)
WBC #/AREA URNS AUTO: 2 /HPF
YERSINIA DNA SPEC NAA+PROBE: NEGATIVE

## 2019-01-01 PROCEDURE — 85018 HEMOGLOBIN: CPT | Performed by: INTERNAL MEDICINE

## 2019-01-01 PROCEDURE — 83735 ASSAY OF MAGNESIUM: CPT | Performed by: INTERNAL MEDICINE

## 2019-01-01 PROCEDURE — 99231 SBSQ HOSP IP/OBS SF/LOW 25: CPT | Performed by: CLINICAL NURSE SPECIALIST

## 2019-01-01 PROCEDURE — 71045 X-RAY EXAM CHEST 1 VIEW: CPT | Performed by: NURSE PRACTITIONER

## 2019-01-01 PROCEDURE — 99223 1ST HOSP IP/OBS HIGH 75: CPT | Performed by: HOSPITALIST

## 2019-01-01 PROCEDURE — 70150 X-RAY EXAM OF FACIAL BONES: CPT | Performed by: INTERNAL MEDICINE

## 2019-01-01 PROCEDURE — 99214 OFFICE O/P EST MOD 30 MIN: CPT | Performed by: INTERNAL MEDICINE

## 2019-01-01 PROCEDURE — 80048 BASIC METABOLIC PNL TOTAL CA: CPT | Performed by: INTERNAL MEDICINE

## 2019-01-01 PROCEDURE — 71045 X-RAY EXAM CHEST 1 VIEW: CPT | Performed by: EMERGENCY MEDICINE

## 2019-01-01 PROCEDURE — 84466 ASSAY OF TRANSFERRIN: CPT | Performed by: INTERNAL MEDICINE

## 2019-01-01 PROCEDURE — 83540 ASSAY OF IRON: CPT | Performed by: INTERNAL MEDICINE

## 2019-01-01 PROCEDURE — 74230 X-RAY XM SWLNG FUNCJ C+: CPT | Performed by: HOSPITALIST

## 2019-01-01 PROCEDURE — 87507 IADNA-DNA/RNA PROBE TQ 12-25: CPT | Performed by: INTERNAL MEDICINE

## 2019-01-01 PROCEDURE — G0439 PPPS, SUBSEQ VISIT: HCPCS | Performed by: INTERNAL MEDICINE

## 2019-01-01 PROCEDURE — 99239 HOSP IP/OBS DSCHRG MGMT >30: CPT | Performed by: HOSPITALIST

## 2019-01-01 PROCEDURE — 99232 SBSQ HOSP IP/OBS MODERATE 35: CPT | Performed by: INTERNAL MEDICINE

## 2019-01-01 PROCEDURE — 80053 COMPREHEN METABOLIC PANEL: CPT | Performed by: INTERNAL MEDICINE

## 2019-01-01 PROCEDURE — 36415 COLL VENOUS BLD VENIPUNCTURE: CPT | Performed by: INTERNAL MEDICINE

## 2019-01-01 PROCEDURE — 99496 TRANSJ CARE MGMT HIGH F2F 7D: CPT | Performed by: INTERNAL MEDICINE

## 2019-01-01 PROCEDURE — 99233 SBSQ HOSP IP/OBS HIGH 50: CPT | Performed by: NURSE PRACTITIONER

## 2019-01-01 PROCEDURE — 76700 US EXAM ABDOM COMPLETE: CPT | Performed by: HOSPITALIST

## 2019-01-01 PROCEDURE — 85025 COMPLETE CBC W/AUTO DIFF WBC: CPT | Performed by: INTERNAL MEDICINE

## 2019-01-01 PROCEDURE — 99233 SBSQ HOSP IP/OBS HIGH 50: CPT | Performed by: HOSPITALIST

## 2019-01-01 PROCEDURE — 82607 VITAMIN B-12: CPT | Performed by: INTERNAL MEDICINE

## 2019-01-01 PROCEDURE — 99221 1ST HOSP IP/OBS SF/LOW 40: CPT | Performed by: INTERNAL MEDICINE

## 2019-01-01 PROCEDURE — 87086 URINE CULTURE/COLONY COUNT: CPT | Performed by: INTERNAL MEDICINE

## 2019-01-01 PROCEDURE — 99236 HOSP IP/OBS SAME DATE HI 85: CPT | Performed by: HOSPITALIST

## 2019-01-01 PROCEDURE — 99223 1ST HOSP IP/OBS HIGH 75: CPT | Performed by: CLINICAL NURSE SPECIALIST

## 2019-01-01 PROCEDURE — 81015 MICROSCOPIC EXAM OF URINE: CPT | Performed by: INTERNAL MEDICINE

## 2019-01-01 PROCEDURE — 87338 HPYLORI STOOL AG IA: CPT | Performed by: INTERNAL MEDICINE

## 2019-01-01 PROCEDURE — 99232 SBSQ HOSP IP/OBS MODERATE 35: CPT | Performed by: NURSE PRACTITIONER

## 2019-01-01 PROCEDURE — 99232 SBSQ HOSP IP/OBS MODERATE 35: CPT | Performed by: HOSPITALIST

## 2019-01-01 PROCEDURE — 81001 URINALYSIS AUTO W/SCOPE: CPT | Performed by: INTERNAL MEDICINE

## 2019-01-01 PROCEDURE — 80061 LIPID PANEL: CPT | Performed by: INTERNAL MEDICINE

## 2019-01-01 PROCEDURE — 71045 X-RAY EXAM CHEST 1 VIEW: CPT

## 2019-01-01 PROCEDURE — 1111F DSCHRG MED/CURRENT MED MERGE: CPT

## 2019-01-01 PROCEDURE — 99233 SBSQ HOSP IP/OBS HIGH 50: CPT | Performed by: CLINICAL NURSE SPECIALIST

## 2019-01-01 PROCEDURE — 71046 X-RAY EXAM CHEST 2 VIEWS: CPT | Performed by: HOSPITALIST

## 2019-01-01 PROCEDURE — 84443 ASSAY THYROID STIM HORMONE: CPT | Performed by: INTERNAL MEDICINE

## 2019-01-01 PROCEDURE — 83880 ASSAY OF NATRIURETIC PEPTIDE: CPT | Performed by: INTERNAL MEDICINE

## 2019-01-01 PROCEDURE — 99213 OFFICE O/P EST LOW 20 MIN: CPT | Performed by: INTERNAL MEDICINE

## 2019-01-01 PROCEDURE — 93306 TTE W/DOPPLER COMPLETE: CPT | Performed by: HOSPITALIST

## 2019-01-01 PROCEDURE — 99495 TRANSJ CARE MGMT MOD F2F 14D: CPT | Performed by: INTERNAL MEDICINE

## 2019-01-01 PROCEDURE — 82746 ASSAY OF FOLIC ACID SERUM: CPT | Performed by: INTERNAL MEDICINE

## 2019-01-01 PROCEDURE — 99222 1ST HOSP IP/OBS MODERATE 55: CPT | Performed by: REGISTERED NURSE

## 2019-01-01 PROCEDURE — 99233 SBSQ HOSP IP/OBS HIGH 50: CPT | Performed by: INTERNAL MEDICINE

## 2019-01-01 PROCEDURE — 82306 VITAMIN D 25 HYDROXY: CPT | Performed by: INTERNAL MEDICINE

## 2019-01-01 RX ORDER — ACETAMINOPHEN 325 MG/1
650 TABLET ORAL EVERY 6 HOURS PRN
Status: DISCONTINUED | OUTPATIENT
Start: 2019-01-01 | End: 2019-01-01

## 2019-01-01 RX ORDER — ONDANSETRON 2 MG/ML
4 INJECTION INTRAMUSCULAR; INTRAVENOUS EVERY 6 HOURS PRN
Status: DISCONTINUED | OUTPATIENT
Start: 2019-01-01 | End: 2019-01-01

## 2019-01-01 RX ORDER — LORAZEPAM 2 MG/ML
0.25 INJECTION INTRAMUSCULAR EVERY 8 HOURS
Status: DISCONTINUED | OUTPATIENT
Start: 2019-01-01 | End: 2019-01-01

## 2019-01-01 RX ORDER — DEXTROSE MONOHYDRATE 25 G/50ML
50 INJECTION, SOLUTION INTRAVENOUS AS NEEDED
Status: DISCONTINUED | OUTPATIENT
Start: 2019-01-01 | End: 2019-01-01

## 2019-01-01 RX ORDER — ALPRAZOLAM 0.5 MG/1
0.5 TABLET ORAL NIGHTLY PRN
Qty: 30 TABLET | Refills: 0 | Status: SHIPPED | OUTPATIENT
Start: 2019-01-01 | End: 2019-01-01

## 2019-01-01 RX ORDER — LORAZEPAM 2 MG/ML
0.5 INJECTION INTRAMUSCULAR EVERY 4 HOURS PRN
Status: DISCONTINUED | OUTPATIENT
Start: 2019-01-01 | End: 2019-01-01

## 2019-01-01 RX ORDER — SODIUM CHLORIDE 9 MG/ML
INJECTION, SOLUTION INTRAVENOUS CONTINUOUS
Status: ACTIVE | OUTPATIENT
Start: 2019-01-01 | End: 2019-01-01

## 2019-01-01 RX ORDER — SPIRONOLACTONE 25 MG/1
TABLET ORAL
Qty: 45 TABLET | Refills: 0 | Status: SHIPPED | COMMUNITY
Start: 2019-01-01 | End: 2019-01-01

## 2019-01-01 RX ORDER — LORAZEPAM 2 MG/ML
1 INJECTION INTRAMUSCULAR EVERY 4 HOURS PRN
Status: DISCONTINUED | OUTPATIENT
Start: 2019-01-01 | End: 2019-01-01

## 2019-01-01 RX ORDER — CEPHALEXIN 500 MG/1
500 CAPSULE ORAL 3 TIMES DAILY
Qty: 15 CAPSULE | Refills: 0 | Status: SHIPPED | OUTPATIENT
Start: 2019-01-01 | End: 2019-01-01

## 2019-01-01 RX ORDER — METOPROLOL SUCCINATE 25 MG/1
TABLET, EXTENDED RELEASE ORAL
Qty: 30 TABLET | Refills: 0 | Status: SHIPPED | OUTPATIENT
Start: 2019-01-01 | End: 2019-01-01

## 2019-01-01 RX ORDER — HALOPERIDOL 5 MG/ML
1.5 INJECTION INTRAMUSCULAR EVERY 4 HOURS PRN
Status: DISCONTINUED | OUTPATIENT
Start: 2019-01-01 | End: 2019-01-01

## 2019-01-01 RX ORDER — LORAZEPAM 2 MG/ML
2 INJECTION INTRAMUSCULAR EVERY 4 HOURS PRN
Status: DISCONTINUED | OUTPATIENT
Start: 2019-01-01 | End: 2019-01-01

## 2019-01-01 RX ORDER — METOPROLOL SUCCINATE 25 MG/1
25 TABLET, EXTENDED RELEASE ORAL
Qty: 30 TABLET | Refills: 0 | Status: SHIPPED | OUTPATIENT
Start: 2019-01-01 | End: 2019-01-01

## 2019-01-01 RX ORDER — FUROSEMIDE 40 MG/1
40 TABLET ORAL DAILY
Status: ON HOLD | COMMUNITY
End: 2019-01-01

## 2019-01-01 RX ORDER — ACETAMINOPHEN 500 MG
500 TABLET ORAL EVERY 6 HOURS PRN
Status: DISCONTINUED | OUTPATIENT
Start: 2019-01-01 | End: 2019-01-01

## 2019-01-01 RX ORDER — MORPHINE SULFATE 4 MG/ML
2 INJECTION, SOLUTION INTRAMUSCULAR; INTRAVENOUS ONCE
Status: COMPLETED | OUTPATIENT
Start: 2019-01-01 | End: 2019-01-01

## 2019-01-01 RX ORDER — FUROSEMIDE 10 MG/ML
40 INJECTION INTRAMUSCULAR; INTRAVENOUS ONCE
Status: COMPLETED | OUTPATIENT
Start: 2019-01-01 | End: 2019-01-01

## 2019-01-01 RX ORDER — ALPRAZOLAM 0.25 MG/1
0.12 TABLET ORAL 4 TIMES DAILY
Status: DISCONTINUED | OUTPATIENT
Start: 2019-01-01 | End: 2019-01-01

## 2019-01-01 RX ORDER — DONEPEZIL HYDROCHLORIDE 5 MG/1
5 TABLET, FILM COATED ORAL NIGHTLY
Status: DISCONTINUED | OUTPATIENT
Start: 2019-01-01 | End: 2019-01-01

## 2019-01-01 RX ORDER — HALOPERIDOL 0.5 MG/1
0.5 TABLET ORAL NIGHTLY
Qty: 30 TABLET | Refills: 0 | Status: SHIPPED | OUTPATIENT
Start: 2019-01-01 | End: 2019-01-01 | Stop reason: RX

## 2019-01-01 RX ORDER — FUROSEMIDE 40 MG/1
40 TABLET ORAL
Status: DISCONTINUED | OUTPATIENT
Start: 2019-01-01 | End: 2019-01-01

## 2019-01-01 RX ORDER — ASPIRIN 81 MG/1
81 TABLET ORAL DAILY
COMMUNITY

## 2019-01-01 RX ORDER — SODIUM CHLORIDE 0.9 % (FLUSH) 0.9 %
10 SYRINGE (ML) INJECTION AS NEEDED
Status: DISCONTINUED | OUTPATIENT
Start: 2019-01-01 | End: 2019-01-01

## 2019-01-01 RX ORDER — POTASSIUM CHLORIDE 1.5 G/1.77G
40 POWDER, FOR SOLUTION ORAL EVERY 4 HOURS
Status: COMPLETED | OUTPATIENT
Start: 2019-01-01 | End: 2019-01-01

## 2019-01-01 RX ORDER — DONEPEZIL HYDROCHLORIDE 5 MG/1
5 TABLET, FILM COATED ORAL
Qty: 90 TABLET | Refills: 0 | Status: SHIPPED | OUTPATIENT
Start: 2019-01-01 | End: 2019-01-01

## 2019-01-01 RX ORDER — SPIRONOLACTONE 25 MG/1
12.5 TABLET ORAL DAILY
Status: DISCONTINUED | OUTPATIENT
Start: 2019-01-01 | End: 2019-01-01

## 2019-01-01 RX ORDER — IPRATROPIUM BROMIDE AND ALBUTEROL SULFATE 2.5; .5 MG/3ML; MG/3ML
3 SOLUTION RESPIRATORY (INHALATION)
Status: DISCONTINUED | OUTPATIENT
Start: 2019-01-01 | End: 2019-01-01

## 2019-01-01 RX ORDER — ALPRAZOLAM 0.5 MG/1
TABLET ORAL
Qty: 30 TABLET | Refills: 0 | Status: SHIPPED | OUTPATIENT
Start: 2019-01-01 | End: 2019-01-01

## 2019-01-01 RX ORDER — FUROSEMIDE 40 MG/1
40 TABLET ORAL SEE ADMIN INSTRUCTIONS
Qty: 90 TABLET | Refills: 0 | Status: SHIPPED | OUTPATIENT
Start: 2019-01-01 | End: 2019-01-01

## 2019-01-01 RX ORDER — METOCLOPRAMIDE 10 MG/1
5 TABLET ORAL EVERY 6 HOURS PRN
Status: DISCONTINUED | OUTPATIENT
Start: 2019-01-01 | End: 2019-01-01

## 2019-01-01 RX ORDER — METOCLOPRAMIDE HYDROCHLORIDE 5 MG/ML
10 INJECTION INTRAMUSCULAR; INTRAVENOUS EVERY 6 HOURS PRN
Status: DISCONTINUED | OUTPATIENT
Start: 2019-01-01 | End: 2019-01-01

## 2019-01-01 RX ORDER — ALPRAZOLAM 0.5 MG/1
0.5 TABLET ORAL NIGHTLY PRN
Status: ON HOLD | COMMUNITY
End: 2019-01-01

## 2019-01-01 RX ORDER — METOPROLOL SUCCINATE 25 MG/1
25 TABLET, EXTENDED RELEASE ORAL
Status: DISCONTINUED | OUTPATIENT
Start: 2019-01-01 | End: 2019-01-01

## 2019-01-01 RX ORDER — FUROSEMIDE 40 MG/1
40 TABLET ORAL
Qty: 60 TABLET | Refills: 0 | Status: SHIPPED | OUTPATIENT
Start: 2019-01-01 | End: 2019-01-01

## 2019-01-01 RX ORDER — PROCHLORPERAZINE 25 MG
25 SUPPOSITORY, RECTAL RECTAL EVERY 6 HOURS PRN
Status: DISCONTINUED | OUTPATIENT
Start: 2019-01-01 | End: 2019-01-01

## 2019-01-01 RX ORDER — ALPRAZOLAM 0.25 MG/1
0.25 TABLET ORAL 2 TIMES DAILY PRN
Qty: 30 TABLET | Refills: 1 | Status: SHIPPED | OUTPATIENT
Start: 2019-01-01 | End: 2019-01-01

## 2019-01-01 RX ORDER — FUROSEMIDE 40 MG/1
40 TABLET ORAL EVERY 8 HOURS PRN
Status: DISCONTINUED | OUTPATIENT
Start: 2019-01-01 | End: 2019-01-01

## 2019-01-01 RX ORDER — LORAZEPAM 2 MG/ML
0.25 INJECTION INTRAMUSCULAR EVERY 6 HOURS PRN
Status: DISCONTINUED | OUTPATIENT
Start: 2019-01-01 | End: 2019-01-01

## 2019-01-01 RX ORDER — ALPRAZOLAM 0.25 MG/1
0.12 TABLET ORAL 4 TIMES DAILY PRN
Status: DISCONTINUED | OUTPATIENT
Start: 2019-01-01 | End: 2019-01-01

## 2019-01-01 RX ORDER — HALOPERIDOL 5 MG/ML
0.5 INJECTION INTRAMUSCULAR EVERY 4 HOURS PRN
Status: DISCONTINUED | OUTPATIENT
Start: 2019-01-01 | End: 2019-01-01

## 2019-01-01 RX ORDER — ATROPINE SULFATE 10 MG/ML
2 SOLUTION/ DROPS OPHTHALMIC EVERY 2 HOUR PRN
Status: DISCONTINUED | OUTPATIENT
Start: 2019-01-01 | End: 2019-01-01

## 2019-01-01 RX ORDER — SCOLOPAMINE TRANSDERMAL SYSTEM 1 MG/1
1 PATCH, EXTENDED RELEASE TRANSDERMAL
Status: DISCONTINUED | OUTPATIENT
Start: 2019-01-01 | End: 2019-01-01

## 2019-01-01 RX ORDER — POTASSIUM CHLORIDE 20 MEQ/1
40 TABLET, EXTENDED RELEASE ORAL ONCE
Status: COMPLETED | OUTPATIENT
Start: 2019-01-01 | End: 2019-01-01

## 2019-01-01 RX ORDER — FUROSEMIDE 10 MG/ML
40 INJECTION INTRAMUSCULAR; INTRAVENOUS EVERY 8 HOURS PRN
Status: DISCONTINUED | OUTPATIENT
Start: 2019-01-01 | End: 2019-01-01

## 2019-01-01 RX ORDER — HEPARIN SODIUM 5000 [USP'U]/ML
5000 INJECTION, SOLUTION INTRAVENOUS; SUBCUTANEOUS EVERY 12 HOURS SCHEDULED
Status: DISCONTINUED | OUTPATIENT
Start: 2019-01-01 | End: 2019-01-01

## 2019-01-01 RX ORDER — SODIUM CHLORIDE 0.9 % (FLUSH) 0.9 %
3 SYRINGE (ML) INJECTION AS NEEDED
Status: DISCONTINUED | OUTPATIENT
Start: 2019-01-01 | End: 2019-01-01

## 2019-01-01 RX ORDER — MORPHINE SULFATE 4 MG/ML
1 INJECTION, SOLUTION INTRAMUSCULAR; INTRAVENOUS
Status: DISCONTINUED | OUTPATIENT
Start: 2019-01-01 | End: 2019-01-01

## 2019-01-01 RX ORDER — ALPRAZOLAM 0.5 MG/1
0.5 TABLET ORAL NIGHTLY
Status: DISCONTINUED | OUTPATIENT
Start: 2019-01-01 | End: 2019-01-01

## 2019-01-01 RX ORDER — LORAZEPAM 2 MG/ML
0.12 INJECTION INTRAMUSCULAR EVERY 8 HOURS
Status: DISCONTINUED | OUTPATIENT
Start: 2019-01-01 | End: 2019-01-01

## 2019-01-01 RX ORDER — SODIUM CHLORIDE 9 MG/ML
INJECTION, SOLUTION INTRAVENOUS CONTINUOUS
Status: DISCONTINUED | OUTPATIENT
Start: 2019-01-01 | End: 2019-01-01

## 2019-01-01 RX ORDER — SPIRONOLACTONE 25 MG/1
12.5 TABLET ORAL DAILY
COMMUNITY

## 2019-01-01 RX ORDER — ALBUTEROL SULFATE 2.5 MG/3ML
2.5 SOLUTION RESPIRATORY (INHALATION) EVERY 4 HOURS PRN
Status: DISCONTINUED | OUTPATIENT
Start: 2019-01-01 | End: 2019-01-01

## 2019-01-01 RX ORDER — METOCLOPRAMIDE HYDROCHLORIDE 5 MG/ML
5 INJECTION INTRAMUSCULAR; INTRAVENOUS EVERY 6 HOURS PRN
Status: DISCONTINUED | OUTPATIENT
Start: 2019-01-01 | End: 2019-01-01

## 2019-01-01 RX ORDER — ALPRAZOLAM 0.5 MG/1
TABLET ORAL
Qty: 30 TABLET | Refills: 2 | Status: SHIPPED | OUTPATIENT
Start: 2019-01-01 | End: 2019-01-01

## 2019-01-01 RX ORDER — ALPRAZOLAM 0.5 MG/1
0.5 TABLET ORAL
COMMUNITY
End: 2019-01-01

## 2019-01-01 RX ORDER — ALPRAZOLAM 0.25 MG/1
0.25 TABLET ORAL 2 TIMES DAILY PRN
Status: DISCONTINUED | OUTPATIENT
Start: 2019-01-01 | End: 2019-01-01

## 2019-01-01 RX ORDER — METOCLOPRAMIDE 10 MG/1
10 TABLET ORAL EVERY 6 HOURS PRN
Status: DISCONTINUED | OUTPATIENT
Start: 2019-01-01 | End: 2019-01-01

## 2019-01-01 RX ORDER — FUROSEMIDE 10 MG/ML
20 INJECTION INTRAMUSCULAR; INTRAVENOUS 3 TIMES DAILY
Status: DISCONTINUED | OUTPATIENT
Start: 2019-01-01 | End: 2019-01-01

## 2019-01-01 RX ORDER — FUROSEMIDE 40 MG/1
40 TABLET ORAL SEE ADMIN INSTRUCTIONS
Qty: 90 TABLET | Refills: 0 | Status: ON HOLD | OUTPATIENT
Start: 2019-01-01 | End: 2019-01-01

## 2019-01-01 RX ORDER — ALPRAZOLAM 0.25 MG/1
0.25 TABLET ORAL 2 TIMES DAILY PRN
Qty: 180 TABLET | Refills: 0 | Status: ON HOLD | OUTPATIENT
Start: 2019-01-01 | End: 2019-01-01

## 2019-01-01 RX ORDER — METOPROLOL SUCCINATE 25 MG/1
TABLET, EXTENDED RELEASE ORAL
Qty: 30 TABLET | Refills: 0 | Status: SHIPPED | OUTPATIENT
Start: 2019-01-01

## 2019-01-01 RX ORDER — FUROSEMIDE 40 MG/1
TABLET ORAL
Qty: 90 TABLET | Refills: 0 | Status: ON HOLD | OUTPATIENT
Start: 2019-01-01 | End: 2019-01-01

## 2019-01-01 RX ORDER — MORPHINE SULFATE IN 0.9 % NACL 1 MG/ML
1 PLASTIC BAG, INJECTION (ML) INTRAVENOUS CONTINUOUS PRN
Status: DISCONTINUED | OUTPATIENT
Start: 2019-01-01 | End: 2019-01-01

## 2019-01-01 RX ORDER — CASTOR OIL AND BALSAM, PERU 788; 87 MG/G; MG/G
1 OINTMENT TOPICAL 2 TIMES DAILY
Qty: 1 TUBE | Refills: 3 | Status: ON HOLD | OUTPATIENT
Start: 2019-01-01 | End: 2019-01-01 | Stop reason: ALTCHOICE

## 2019-01-01 RX ORDER — ALPRAZOLAM 0.5 MG/1
TABLET ORAL
Qty: 90 TABLET | Refills: 0 | OUTPATIENT
Start: 2019-01-01

## 2019-01-01 RX ORDER — ALPRAZOLAM 0.25 MG/1
0.25 TABLET ORAL 2 TIMES DAILY PRN
Qty: 60 TABLET | Refills: 0 | OUTPATIENT
Start: 2019-01-01

## 2019-01-01 RX ORDER — ALPRAZOLAM 0.25 MG/1
0.25 TABLET ORAL 2 TIMES DAILY PRN
Qty: 180 TABLET | Refills: 1 | Status: SHIPPED | OUTPATIENT
Start: 2019-01-01 | End: 2019-01-01

## 2019-01-01 RX ORDER — DIPHENHYDRAMINE HCL 25 MG
25 CAPSULE ORAL EVERY 4 HOURS PRN
Status: DISCONTINUED | OUTPATIENT
Start: 2019-01-01 | End: 2019-01-01

## 2019-01-01 RX ORDER — BISACODYL 10 MG
10 SUPPOSITORY, RECTAL RECTAL
Status: DISCONTINUED | OUTPATIENT
Start: 2019-01-01 | End: 2019-01-01

## 2019-01-01 RX ORDER — HALOPERIDOL 1 MG/1
0.5 TABLET ORAL NIGHTLY
Qty: 90 TABLET | Refills: 1 | Status: SHIPPED | OUTPATIENT
Start: 2019-01-01 | End: 2019-01-01 | Stop reason: ALTCHOICE

## 2019-01-01 RX ORDER — HALOPERIDOL 1 MG/1
0.5 TABLET ORAL NIGHTLY
COMMUNITY
End: 2019-01-01

## 2019-01-01 RX ORDER — ALPRAZOLAM 0.5 MG/1
TABLET ORAL
Qty: 30 TABLET | Refills: 0 | OUTPATIENT
Start: 2019-01-01

## 2019-01-01 RX ORDER — FUROSEMIDE 40 MG/1
TABLET ORAL SEE ADMIN INSTRUCTIONS
Status: ON HOLD | COMMUNITY
End: 2019-01-01

## 2019-01-01 RX ORDER — FUROSEMIDE 10 MG/ML
40 INJECTION INTRAMUSCULAR; INTRAVENOUS 2 TIMES DAILY
Status: DISCONTINUED | OUTPATIENT
Start: 2019-01-01 | End: 2019-01-01

## 2019-01-01 RX ORDER — HALOPERIDOL 5 MG/ML
1 INJECTION INTRAMUSCULAR EVERY 4 HOURS PRN
Status: DISCONTINUED | OUTPATIENT
Start: 2019-01-01 | End: 2019-01-01

## 2019-01-01 RX ORDER — ALPRAZOLAM 0.25 MG/1
0.12 TABLET ORAL 3 TIMES DAILY PRN
Status: DISCONTINUED | OUTPATIENT
Start: 2019-01-01 | End: 2019-01-01

## 2019-01-01 RX ORDER — IPRATROPIUM BROMIDE AND ALBUTEROL SULFATE 2.5; .5 MG/3ML; MG/3ML
3 SOLUTION RESPIRATORY (INHALATION) EVERY 6 HOURS PRN
Status: DISCONTINUED | OUTPATIENT
Start: 2019-01-01 | End: 2019-01-01

## 2019-01-01 RX ORDER — FUROSEMIDE 40 MG/1
40 TABLET ORAL
Refills: 0 | Status: SHIPPED | COMMUNITY
Start: 2019-01-01

## 2019-01-01 RX ORDER — ASPIRIN 81 MG/1
81 TABLET ORAL DAILY
Status: DISCONTINUED | OUTPATIENT
Start: 2019-01-01 | End: 2019-01-01

## 2019-01-01 RX ORDER — ALPRAZOLAM 0.5 MG/1
0.5 TABLET ORAL NIGHTLY PRN
Status: DISCONTINUED | OUTPATIENT
Start: 2019-01-01 | End: 2019-01-01

## 2019-01-01 RX ORDER — SPIRONOLACTONE 25 MG/1
TABLET ORAL
Qty: 45 TABLET | Refills: 0 | Status: ON HOLD | OUTPATIENT
Start: 2019-01-01 | End: 2019-01-01

## 2019-01-01 RX ORDER — GLYCOPYRROLATE 0.2 MG/ML
0.4 INJECTION, SOLUTION INTRAMUSCULAR; INTRAVENOUS
Status: DISCONTINUED | OUTPATIENT
Start: 2019-01-01 | End: 2019-01-01

## 2019-01-01 RX ORDER — MORPHINE SULFATE 4 MG/ML
1 INJECTION, SOLUTION INTRAMUSCULAR; INTRAVENOUS ONCE
Status: DISCONTINUED | OUTPATIENT
Start: 2019-01-01 | End: 2019-01-01

## 2019-01-01 RX ORDER — DOXEPIN HYDROCHLORIDE 50 MG/1
1 CAPSULE ORAL DAILY
Status: DISCONTINUED | OUTPATIENT
Start: 2019-01-01 | End: 2019-01-01

## 2019-01-01 RX ORDER — LORAZEPAM 2 MG/ML
0.12 INJECTION INTRAMUSCULAR EVERY 8 HOURS PRN
Status: DISCONTINUED | OUTPATIENT
Start: 2019-01-01 | End: 2019-01-01

## 2019-01-01 RX ORDER — FUROSEMIDE 20 MG/1
TABLET ORAL
Qty: 60 TABLET | Refills: 0 | Status: ON HOLD | OUTPATIENT
Start: 2019-01-01 | End: 2019-01-01

## 2019-01-01 RX ORDER — ALPRAZOLAM 0.25 MG/1
0.25 TABLET ORAL 2 TIMES DAILY PRN
Qty: 30 TABLET | Refills: 0 | Status: SHIPPED | OUTPATIENT
Start: 2019-01-01 | End: 2019-01-01

## 2019-01-01 RX ORDER — HALOPERIDOL 5 MG/ML
1 INJECTION INTRAMUSCULAR ONCE
Status: COMPLETED | OUTPATIENT
Start: 2019-01-01 | End: 2019-01-01

## 2019-01-01 RX ORDER — DONEPEZIL HYDROCHLORIDE 5 MG/1
5 TABLET, FILM COATED ORAL
Status: DISCONTINUED | OUTPATIENT
Start: 2019-01-01 | End: 2019-01-01

## 2019-01-01 RX ORDER — ALPRAZOLAM 0.5 MG/1
0.5 TABLET ORAL NIGHTLY PRN
Qty: 90 TABLET | Refills: 0 | Status: SHIPPED | OUTPATIENT
Start: 2019-01-01 | End: 2019-01-01

## 2019-01-01 RX ORDER — METOPROLOL SUCCINATE 25 MG/1
25 TABLET, EXTENDED RELEASE ORAL DAILY
Status: DISCONTINUED | OUTPATIENT
Start: 2019-01-01 | End: 2019-01-01

## 2019-01-01 RX ORDER — IBUPROFEN 400 MG/1
400 TABLET ORAL ONCE
Status: COMPLETED | OUTPATIENT
Start: 2019-01-01 | End: 2019-01-01

## 2019-01-01 RX ORDER — HEPARIN SODIUM 5000 [USP'U]/ML
5000 INJECTION, SOLUTION INTRAVENOUS; SUBCUTANEOUS EVERY 8 HOURS SCHEDULED
Status: DISCONTINUED | OUTPATIENT
Start: 2019-01-01 | End: 2019-01-01

## 2019-01-01 RX ORDER — SPIRONOLACTONE 25 MG/1
TABLET ORAL
Qty: 45 TABLET | Refills: 3 | Status: SHIPPED | OUTPATIENT
Start: 2019-01-01 | End: 2019-01-01

## 2019-01-01 RX ORDER — MORPHINE SULFATE 2 MG/ML
1 INJECTION, SOLUTION INTRAMUSCULAR; INTRAVENOUS
Status: DISCONTINUED | OUTPATIENT
Start: 2019-01-01 | End: 2019-01-01

## 2019-01-01 RX ORDER — ACETAMINOPHEN 500 MG
1000 TABLET ORAL ONCE
Status: COMPLETED | OUTPATIENT
Start: 2019-01-01 | End: 2019-01-01

## 2019-01-01 RX ORDER — FUROSEMIDE 10 MG/ML
20 INJECTION INTRAMUSCULAR; INTRAVENOUS 3 TIMES DAILY
Status: COMPLETED | OUTPATIENT
Start: 2019-01-01 | End: 2019-01-01

## 2019-01-01 RX ORDER — ALPRAZOLAM 0.5 MG/1
TABLET ORAL
Qty: 30 TABLET | Refills: 1 | Status: SHIPPED | OUTPATIENT
Start: 2019-01-01 | End: 2019-01-01

## 2019-01-01 RX ORDER — PANTOPRAZOLE SODIUM 40 MG/1
40 TABLET, DELAYED RELEASE ORAL
Qty: 30 TABLET | Refills: 0 | Status: ON HOLD | OUTPATIENT
Start: 2019-01-01 | End: 2019-01-01

## 2019-01-01 RX ORDER — SPIRONOLACTONE 25 MG/1
12.5 TABLET ORAL EVERY EVENING
Qty: 30 TABLET | Refills: 1 | Status: SHIPPED | OUTPATIENT
Start: 2019-01-01 | End: 2019-01-01

## 2019-01-01 RX ORDER — ALPRAZOLAM 0.5 MG/1
TABLET ORAL
Qty: 30 TABLET | Refills: 1 | Status: SHIPPED | OUTPATIENT
Start: 2019-01-01

## 2019-01-01 RX ORDER — MORPHINE SULFATE 4 MG/ML
2 INJECTION, SOLUTION INTRAMUSCULAR; INTRAVENOUS
Status: DISCONTINUED | OUTPATIENT
Start: 2019-01-01 | End: 2019-01-01

## 2019-01-01 RX ORDER — MULTIVITAMIN WITH FOLIC ACID 400 MCG
1 TABLET ORAL DAILY
COMMUNITY

## 2019-01-01 RX ORDER — NITROGLYCERIN 0.4 MG/1
0.4 TABLET SUBLINGUAL EVERY 5 MIN PRN
Status: DISCONTINUED | OUTPATIENT
Start: 2019-01-01 | End: 2019-01-01

## 2019-01-01 RX ORDER — FUROSEMIDE 40 MG/1
TABLET ORAL SEE ADMIN INSTRUCTIONS
Refills: 0 | Status: SHIPPED | COMMUNITY
Start: 2019-01-01 | End: 2019-01-01

## 2019-01-01 RX ORDER — FUROSEMIDE 10 MG/ML
20 INJECTION INTRAMUSCULAR; INTRAVENOUS ONCE
Status: COMPLETED | OUTPATIENT
Start: 2019-01-01 | End: 2019-01-01

## 2019-01-01 RX ORDER — PROCHLORPERAZINE MALEATE 10 MG
10 TABLET ORAL EVERY 4 HOURS PRN
Status: DISCONTINUED | OUTPATIENT
Start: 2019-01-01 | End: 2019-01-01

## 2019-01-19 PROBLEM — I50.22 CHRONIC SYSTOLIC HEART FAILURE (HCC): Status: ACTIVE | Noted: 2018-09-26

## 2019-01-19 NOTE — PROGRESS NOTES
Flaca Wright is a 80year old female. No chief complaint on file. HPI:       Comes to office accompanied by her son. C/o neck on left and shouders hurt. No numbness or pain to arms. No weakness of upper extremities. Soreness  buttock.   P Vitamins-Minerals (MULTIVITAL) Oral Tab Take 1 tablet by mouth daily.  Disp:  Rfl:         History:  Past Medical History:   Diagnosis Date   • Anxiety state, unspecified    • Arrhythmia     A fib   • Arthritis     RA   • Atrial fibrillation (HCC)    • CHF orientated, pleasant  HEENT-, extraocular muscles intact. Conjunctive pink, sclerae anicteric. Oral mucosa within normal limits  Neck-supple, no carotid bruits, no adenopathy.   Thyroid normal.  Discomfort  on palpation of cervical spine,   discomfort lef congestive heart failure, NYHA class 2 ([facility]  Plan: No edema. Patient does not complain of dizziness.   Continue present treatment    (M05.79) Rheumatoid arthritis involving multiple sites with positive rheumatoid factor ([facility]  Plan: Involvement with neck

## 2019-01-19 NOTE — PATIENT INSTRUCTIONS
Tylenol  For neck and shoulder pain  Heat or ice to shoulder   Salan pas patches to shoulder    Venelex to buttock ulcer twice daily  Get up from sitting every 30 minutes to walk  Try to lie on your side at night

## 2019-02-12 PROBLEM — N17.9 ACUTE KIDNEY INJURY (HCC): Status: ACTIVE | Noted: 2019-01-01

## 2019-02-12 PROBLEM — N30.01 ACUTE CYSTITIS WITH HEMATURIA: Status: ACTIVE | Noted: 2019-01-01

## 2019-02-12 PROBLEM — R41.0 DELIRIUM, ACUTE: Status: ACTIVE | Noted: 2019-01-01

## 2019-02-12 NOTE — ED INITIAL ASSESSMENT (HPI)
Caregiver reports Blas Martinez had some \"shaking/chills\" yesterday that was relieved by tylenol. Again this morning around 4 AM c/o being cold and had chills    No fever noted in triage. No cough, no belly pain.

## 2019-02-12 NOTE — ED PROVIDER NOTES
Patient Seen in: [facility] AND [facility] Emergency Department    History   Patient presents with:  Fever (infectious)    Stated Complaint: Chills/    HPI    The patient is a 79-year-old female with a history of dementia atrial fibrillation, hypertension who wa above.    Physical Exam     ED Triage Vitals [02/12/19 1025]   /54   Pulse 83   Resp 18   Temp 97.3 °F (36.3 °C)   Temp src Oral   SpO2 100 %   O2 Device None (Room air)       Current:BP 92/56   Pulse 71   Temp 97.3 °F (36.3 °C) (Oral)   Resp 16   Ht within normal limits   URINALYSIS WITH CULTURE REFLEX - Abnormal; Notable for the following components:    Clarity Urine Cloudy (*)     Protein Urine 30  (*)     Blood Urine Large (*)     Leukocyte Esterase Urine Large (*)     WBC Urine 862 (*)     [facility]    Disposition:  Admit  2/12/2019 11:34 am    Follow-up:  No follow-up provider specified.   We recommend that you schedule follow up care with a primary care provider within the next three months to obtain basic health screening including reassessmen

## 2019-02-12 NOTE — CM/SW NOTE
STACEY spoke with the pt's son Louis Alonzo via telephone. The pt. Lives with a 24/7 caregiver in a split level home with 1 step to enter and 2 steps up to the bedroom and bathroom. The pt. Has been using a walker to assist with ambulation.   The pt's caregiver ehsan

## 2019-02-12 NOTE — H&P
[address],  [address] Patient Status:  Emergency    1925 MRN [id number]   Location [address] Attending Lv Worthy MD   Hosp Day # 0 PCP Nicky Azevedo MD No    Allergies/Medications: Allergies: No Known Allergies    (Not in a hospital admission)    Review of Systems:   Pertinent items are noted in HPI.   Otherwise negative  Comprehensive 12 point ROS performed      Physical Exam:   Vital Signs:  Blood pres 09/27/2018    LIP 54 (H) 03/03/2017    MG 2.1 12/27/2018    TROP 0.03 09/26/2018    CK 78 03/03/2017    B12 397 03/16/2017                 Assessment/Plan:     Acute encephalopathy, metabolic from underlying infection  H/o dementia, on memantine and donepe

## 2019-02-13 NOTE — HOME CARE LIAISON
Received referral from [facility], for residential home health. Spoke with patient's son Louis Alonzo. Louis Alonzo is agreeable to Residential Home Health services at discharge. Patient given brochure and liaison card. All questions and concerns were addressed.  Will co

## 2019-02-13 NOTE — PROGRESS NOTES
Patient has been screaming,yelling, and unable to follow simple commands. Primary and son was made aware of patient behavior. Patient is close to RN station for safety,chair alarm in place, and call light within reach.  PRN schedule medication was given for

## 2019-02-13 NOTE — PHYSICAL THERAPY NOTE
PHYSICAL THERAPY EVALUATION - INPATIENT     Room Number: 567/567-A  Evaluation Date: 2/13/2019  Type of Evaluation: Initial   Physician Order: PT Eval and Treat    Presenting Problem: hematuria(weakness)  Reason for Therapy: Mobility Dysfunction and Disch [facility])      Past Medical History  Past Medical History:   Diagnosis Date   • Anxiety state, unspecified    • Arrhythmia     A fib   • Congestive heart disease ([facility]    • Dementia    • Depression    • Hearing impairment    • High blood pressure    • High cho 4/5    BALANCE  Static Sitting: Fair +  Dynamic Sitting: Fair  Static Standing: Fair -  Dynamic Standing: Poor +    ADDITIONAL TESTS                                    NEUROLOGICAL FINDINGS                      ACTIVITY TOLERANCE                         O2 home   Goal #1 Patient is able to demonstrate supine - sit EOB @ level: supervision     Goal #1   Current Status Min A   Goal #2 Patient is able to demonstrate transfers Sit to/from Stand at assistance level: supervision with walker - rolling     Goal #2

## 2019-02-13 NOTE — PLAN OF CARE
Patient/Family Goals    • Patient/Family Long Term Goal Not Progressing          CONFUSION    • Confusion, delirium, dementia or psychosis is improved or at baseline Progressing        Delirium    • Minimize duration of delirium Progressing        PAIN - A

## 2019-02-13 NOTE — TELEPHONE ENCOUNTER
Patient will be discharge from hospital today, and they would like to set her up with a home RN. Will need Dr to sign the orders if you are ok with the home RN.    2/14-spoke with patient son, Ms. Harleen Campos is still in the hospital, she did not get discharge

## 2019-02-13 NOTE — PROGRESS NOTES
[facility]  Hospitalist Progress Note     Shabana Young Patient Status:  Inpatient    1925  80year old CSN [id number]   Location -A Attending Leonel Aaron MD   Hosp Day # 1 PCP Hawa San MD     ASSESSMENT/PL 10.4*   HCT  33.7*  34.8*   MCV  102.4*  103.6*   MCH  31.6  31.0   MCHC  30.9*  29.9*   RDW  14.4  14.4   NEPRELIM  8.89*  5.34   WBC  9.8  6.9   PLT  132.0*  109.0*     Recent Labs   Lab  02/12/19   1040  02/13/19   0630   GLU  201*  91   BUN  42*  41*

## 2019-02-13 NOTE — OCCUPATIONAL THERAPY NOTE
OCCUPATIONAL THERAPY EVALUATION - INPATIENT     Room Number: 567/567-A  Evaluation Date: 2/13/2019  Type of Evaluation: Initial  Presenting Problem: UTI    Physician Order: IP Consult to Occupational Therapy  Reason for Therapy: ADL/IADL Dysfunction and Oli Dangelo feeding in supported sitting position provided Min A to SBA for safety.  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     The patient's Approx Degree of Impairment: 56.46% has been calculated based on documentation in the [facility] '6 clicks' level  Lives With: Caregiver 24 hours                      Drives: No       Stairs in Home: 1 CONY  + 2 steps to bedroom  Use of Assistive Device(s): unclear as pt is poor historian     Prior Level of [place]: pt with 24hr caregiver per social work --p washing, rinsing, drying)?: A Lot  -   Toileting, which includes using toilet, bedpan or urinal? : A Lot  -   Putting on and taking off regular upper body clothing?: A Little  -   Taking care of personal grooming such as brushing teeth?: A Little  -   Eati

## 2019-02-14 NOTE — PLAN OF CARE
Problem: Patient Centered Care  Goal: Patient preferences are identified and integrated in the patient's plan of care  Interventions:  - What would you like us to know as we care for you?  Per sonAlba Clock: patient is confused and has 24 hour caregivers, and MD/LIP if interventions unsuccessful or patient reports new pain  - Anticipate increased pain with activity and pre-medicate as appropriate  Outcome: Progressing      Problem: SAFETY ADULT - FALL  Goal: Free from fall injury  INTERVENTIONS:  - Assess pt fr sleep, encourage patient's normal rest cycle i.e. lights off, TV off, minimize noise and interruptions  - Encourage family to assist in orientation and promotion of home routines  Outcome: Progressing      Problem: Diabetes/Glucose Control  Goal: Glucose m

## 2019-02-14 NOTE — PLAN OF CARE
CONFUSION    • Confusion, delirium, dementia or psychosis is improved or at baseline Progressing        Delirium    • Minimize duration of delirium Progressing        Diabetes/Glucose Control    • Glucose maintained within prescribed range Progressing

## 2019-02-14 NOTE — TELEPHONE ENCOUNTER
Call received from Luisa pts son stating he had gone to multiple pharmacies and Haldol 0.5 mg tablets were not available at any pharmacy. States going to [facility].  RN spoke to pharmacist whom states this medication is in

## 2019-02-14 NOTE — CM/SW NOTE
Pt able to d/c today - SW notified [facility].     Traillanil Silvestre, [address]

## 2019-02-14 NOTE — PLAN OF CARE
Problem: Patient Centered Care  Goal: Patient preferences are identified and integrated in the patient's plan of care  Interventions:  - What would you like us to know as we care for you?  Per son, Andre Skgab: patient is confused and has 24 hour caregivers, and opioid side effects  - Notify MD/LIP if interventions unsuccessful or patient reports new pain  - Anticipate increased pain with activity and pre-medicate as appropriate  Outcome: Adequate for Discharge      Problem: SAFETY ADULT - FALL  Goal: Free from fa reorientation  - Promote wakefulness i.e. lights on, blinds open  - Promote sleep, encourage patient's normal rest cycle i.e. lights off, TV off, minimize noise and interruptions  - Encourage family to assist in orientation and promotion of home routines

## 2019-02-14 NOTE — PROGRESS NOTES
Pt discharged home, with family and care taker. discharge instruction provided.   Pt taken down via wheelchair

## 2019-02-14 NOTE — PHYSICAL THERAPY NOTE
PHYSICAL THERAPY TREATMENT NOTE - INPATIENT     Room Number: 567/567-A       Presenting Problem: hematuria(weakness)    Problem List  Principal Problem:    Acute cystitis with hematuria  Active Problems:    Delirium, acute    Acute kidney injury ([facility] adjusting bedclothes, sheets and blankets)?: A Little   -   Sitting down on and standing up from a chair with arms (e.g., wheelchair, bedside commode, etc.): A Little   -   Moving from lying on back to sitting on the side of the bed?: A Little   How much h demonstrate independence with home activity/exercise instructions provided to patient in preparation for discharge.

## 2019-02-14 NOTE — DISCHARGE SUMMARY
[facility] HOSPITALIST  DISCHARGE SUMMARY     Pauline Montaño Patient Status:  Inpatient    1925 MRN [id number]   Location [facility] 5SW/SE Attending Jorge Wetzel MD   Hosp Day # 2 PCP Hawa Pettit MD     DATE OF ADMISSION:  [66-91] 75  Resp:  [18] 18  BP: (118-140)/(54-81) 118/57  Gen: A+Ox2. No distress. HEENT: NCAT, neck supple, no carotid bruit. CV: RRR, S1S2, and intact distal pulses. No gallop, rub, murmur.   Pulm: Effort and breath sounds normal. No distress, wheezes Prescription details   acetaminophen 500 MG Tabs  Commonly known as:  TYLENOL EXTRA STRENGTH      Take 500 mg by mouth every 6 (six) hours as needed for Pain. Refills:  0     Metoprolol Succinate ER 25 MG Tb24  Commonly known as:   Toprol XL      TAKE ONE

## 2019-02-15 NOTE — PROGRESS NOTES
Initial Post Discharge Follow Up   Discharge Date: 2/14/19  Contact Date: 2/15/2019    Consent Verification:  Assessment Completed With: Caregiver: son, Darlin Meredith received per patient?  written  HIPAA Verified?   Yes    Discharge Dx:  E.coli UTI, (six) hours as needed for Pain.  Disp:  Rfl:    DONEPEZIL HCL 5 MG Oral Tab TAKE ONE TABLET BY MOUTH DAILY (Patient taking differently: 5 mg by mouth daily) Disp: 90 tablet Rfl: 0   Multiple Vitamins-Minerals (MULTIVITAL) Oral Tab Take 1 tablet by mouth jamir Rating: How would you rate the care you received while in the hospital?  good      Interventions by NCM:  All d/c instructions reviewed w/ pt's son. Reviewed when to call MD vs when to go to ER/call 911. Reviewed /s of UTI with son.   Educated him on th

## 2019-02-21 NOTE — PROGRESS NOTES
HPI:    Brandon Mullins is a 80year old female here today for hospital follow up.    She was discharged from Inpatient hospital, [facility]  to Home   Admission Date: 2/12/19   Discharge Date: 2/14/19  Hospital Discharge Diagnoses (since 1/22/20 Visit: ALPRAZolam 0.25 MG Oral Tab Take 1 tablet (0.25 mg total) by mouth 2 (two) times daily as needed. May take 1/2 tablet twice daily and 1 whole tablet at night.   furosemide 40 MG Oral Tab Take by mouth See Admin Instructions.  20 mg on Sunday, Lupe Stubbs weakness  PSYCHE: anxiety  HEMATOLOGIC: denies hx of anemia, or bruising, denies bleeding  ENDOCRINE: denies thyroid history  ALL/ASTHMA: denies hx of allergy or asthma    PHYSICAL EXAM:   No LMP recorded. Patient has had a hysterectomy.   Estimated body ma controlled    (C20.31) Chronic diastolic congestive heart failure (HCC)  Plan: Controlled with diuretics    (M05.79) Rheumatoid arthritis involving multiple sites with positive rheumatoid factor (HCC)  Plan: Multiple rheumatoid nodules especially hands, kn

## 2019-02-21 NOTE — PROGRESS NOTES
Susan Lopez is a 80year old female.   Patient presents with:  TCM (Transition Of Care Management): pt presents to clinic for TCM visit       HPI:         ***         Allergies:   No Known Allergies     Current Meds:    Current Outpatient Medication CAD      Social History    Tobacco Use      Smoking status: Never Smoker      Smokeless tobacco: Never Used    Alcohol use: No    Drug use: No         Review of System:  CONSTITUTION: denies fevers,  chills, or sweats  HEENT: denies sore throat,  change Calculated Osmolality 296 (H) 275 - 295 mOsm/kg    GFR, Non- 31 (L) >=60    GFR, -American 36 (L) >=60   URINALYSIS WITH CULTURE REFLEX   Result Value Ref Range    Urine Color Yellow Yellow    Clarity Urine Cloudy (A) Clear    Spec G Total 8.0 (L) 8.5 - 10.1 mg/dL    Calculated Osmolality 300 (H) 275 - 295 mOsm/kg    GFR, Non- 34 (L) >=60    GFR, -American 39 (L) >=60    Albumin 2.9 (L) 3.4 - 5.0 g/dL    Phosphorus 3.9 2.5 - 4.9 mg/dL   HEPATIC FUNCTION PANEL (7) 114 (H) 70 - 99   POCT GLUCOSE   Result Value Ref Range    POC Glucose  103 (H) 70 - 99   RAINBOW DRAW BLUE   Result Value Ref Range    Hold Blue Auto Resulted    RAINBOW DRAW LAVENDER   Result Value Ref Range    Hold Lavender Auto Resulted    RAINBOW DRAW CBC W/ DIFFERENTIAL   Result Value Ref Range    WBC 6.9 4.0 - 11.0 x10(3) uL    RBC 3.36 (L) 3.80 - 5.30 x10(6)uL    HGB 10.4 (L) 12.0 - 16.0 g/dL    HCT 34.8 (L) 35.0 - 48.0 %    .6 (H) 80.0 - 100.0 fL    MCH 31.0 26.0 - 34.0 pg    MCHC 29.9 (L) INDICATIONS: fever and urinary tract infection  TECHNIQUE:   The abdomen was evaluated with grayscale and colorflow of the main vessels. FINDINGS:  LIVER: Normal size, echotexture and colorflow. No significant masses.  GALLBLADDER: The patient is post ch

## 2019-02-21 NOTE — PATIENT INSTRUCTIONS
May try 1/2 Alprazolam at 9 a.m., and 1/2  1 or 2 other times in daytime as needed for anxiety  See if Haldol works better at bedtime instead of alprazolam

## 2019-03-06 NOTE — TELEPHONE ENCOUNTER
Order was faxed yesterday  Nothing in the system indicate 0.5 mg dose given befor  Patient can take 2 of the 0.25 mg until Dr John Boston comes back since we dont know the patient cannot order 2 different strength

## 2019-03-06 NOTE — TELEPHONE ENCOUNTER
Pharmacy is still waiting on the ALPRAZolam .5 mg, because the pt takes two different MG of the same med.   (0.25 and 0.50 mgs)

## 2019-03-13 NOTE — PROGRESS NOTES
Declan Richardson is a 80year old female. Patient presents with:  Medication Follow-Up: pt presents to clinic for medication follow up  Fall: had a fall two weeks ago       HPI:        Mar 1st, fell getting into chair, hit face on her walker.    Call fr arthritis [facility])    • Spinal stenosis    • Spinal stenosis in cervical region    • Visual impairment       Past Surgical History:   Procedure Laterality Date   • CATARACT EXTRACTION     • CHOLECYSTECTOMY     • COLONOSCOPY     • COLONOSCOPY     • HYSTERECTOM PANEL BY PCR   Result Value Ref Range    Campylobacter Pcr Negative Negative    C.  Difficile Toxin A/B Pcr Negative Negative    Plesiomonas Shigelloides Pcr Negative Negative    Salmonella Pcr Negative Negative    Vibrio Pcr Negative Negative    Vibrio Cho simple cyst within the inferior pole of the right kidney, unchanged. AORTA/VASCULAR: No aneurysm. Patent IVC. OTHER: Small bilateral pleural effusions are seen. .  No ascites or adenopathy seen. .          CONCLUSION:   Small bilateral pleural effusions.

## 2019-03-13 NOTE — PROGRESS NOTES
Pt presented to clinic today for blood draw. Per physician able to draw orders. Orders  documented within chart. Pt tolerated lab draw well.  verified.   Orders drawn include: bmp   Site of draw: rt she Butcher CMA

## 2019-03-18 NOTE — TELEPHONE ENCOUNTER
Patient's son Reina Armenta, advised to maintain on Xanax at nighttime rather than starting a new medicine. Reina Armenta stated that there was no noticeable difference between Haldol and Xanax versus plain Xanax, and she can just be maintained on Xanax at nighttime.

## 2019-03-22 NOTE — TELEPHONE ENCOUNTER
FYI:  pt is doing well, but she has some redness to her buttock so would like to order a skin barrier cream, no phone call needed, unless Dr disagrees.

## 2019-03-28 NOTE — TELEPHONE ENCOUNTER
Amalia from [facility] called to let  Dr. Pastor Hdez  know Ms. Hahn Point was discharge and that she is doing much better. For further information please call Amalia at [phone]. Thank you.

## 2019-05-11 NOTE — PROGRESS NOTES
Suella Spurling is a 80year old female. Patient presents with:   Follow - Up: pt presents to clinic for 2 month follow up          HPI:         Patient is accompanied by her son, who states that she has not complained of any problems and is generally localized, unspecified site    • Rheumatoid arthritis [facility])    • Spinal stenosis    • Spinal stenosis in cervical region    • Visual impairment       Past Surgical History:   Procedure Laterality Date   • CATARACT EXTRACTION     • CHOLECYSTECTOMY     • COL redness, dull erythema at gluteal fold without any ulceration    Objectives:  Results for orders placed or performed in visit on 41/06/71   BASIC METABOLIC PANEL (8)   Result Value Ref Range    Glucose 106 (H) 70 - 99 mg/dL    Sodium 138 136 - 145 mmol/L visit.    2 months follow-up        Alvino Cristina MD

## 2019-06-15 NOTE — TELEPHONE ENCOUNTER
Patient's son Louis Alonzo stated that his mother has gained weight and had some fairly mild shortness of breath with walking. This is been since last visit since furosemide decreased to 40 alternating with 20 mg every other day.   She is not very short of breath

## 2019-06-19 PROBLEM — I50.33 ACUTE ON CHRONIC DIASTOLIC (CONGESTIVE) HEART FAILURE (HCC): Status: ACTIVE | Noted: 2019-01-01

## 2019-06-19 NOTE — HISTORICAL OFFICE NOTE
Louisa Merrill  : 1925  ACCOUNT: [id number]  [phone]  PCP: Dr. Seamus Otero    TODAY'S DATE: 2017  DICTATED BY: Becky Hoang M.D.]    CHIEF COMPLAINT: [Followup of Chronic atrial fibrillation, Followup of Hypercholesteremia, pure, Follo thyroidmegaly, multinodular goiter, peripheral neuropathy, microvascular CNS disease, rheumatoid arthritis, cholecystectomy, L cataract, cyst on spine, bilat carpal tunnel and tonsillectomy    PAST CV HISTORY: atrial fibrillation, carotid artery stenosis, pulse deficit, but I do not want to change her medications, knowing that her pressure is 122/70 mmHg, and she does not exercise much. She walks with a walker with very limited activity. I think the rate we have now is her rate most of the time.      Her pul DAILY.  04/10/15 Aspir-81 81MG 1 tablet  04/10/15 Claritin 10MG 1 tablet daily  04/10/15 Multivitamins 1 capsule daily  01/19/11 Refresh 1% as directed

## 2019-06-19 NOTE — PROGRESS NOTES
Jacquelyn Keller is a 80year old female. Patient presents with:  Breathing Problem: pt in c/o heavy breathing for 2 weeks   Weight Problem: weight gain       HPI:    Patient comes to office accompanied by her son Rashida Gonsalez, and her caretaker.    Rashida Gonsalez note abdominal pain  : no c/o  dysuria  MUSCULOSKELETAL: lo back  pain, per son  NEUROLOGICAL: no HA  HEME:  Bruising on aspirin  PSYCH: dementia    Physical Exam:   06/19/19  1301   BP: 130/70   Pulse: 67   Resp: 17   Temp: 97.4 °F (36.3 °C)   TempSrc: Oral regurgitation  Plan: mild Aortic regurgitation, mitral regurgitation; severely dilated left atrium; severe tricuspid regurgitation and moderate pulmonic regurgitation.   Right ventricular function reduced    (G30.1,  F02.81) Late onset Alzheimer's disease w

## 2019-06-19 NOTE — CONSULTS
[facility]    Report of Consultation    Alejo Payne Patient Status:  Inpatient    1925 MRN [id number]   Location [facility] 3W/SW Attending Dhaval Castellano MD   Hosp Day # 0 PCP Anika Foy MD     Date of Adm used to see Dr. Juliocesar Smith but has not seen him since 2017. Patient gives a limited history. She is comfortable at rest.  She does note mild swelling her legs. The family denies extra salt in her diet.   Patient has occasional chest pain after eating with his Units Subcutaneous 2 times per day   acetaminophen (TYLENOL) tab 650 mg 650 mg Oral Q6H PRN   ondansetron HCl (ZOFRAN) injection 4 mg 4 mg Intravenous Q6H PRN   acetaminophen (TYLENOL EXTRA STRENGTH) tab 500 mg 500 mg Oral Q6H PRN   ALPRAZolam (XANAX) tab No intake or output data in the 24 hours ending 06/19/19 5366   This shift: No intake/output data recorded.        Scheduled Meds:    • furosemide  40 mg Intravenous BID   • Heparin Sodium (Porcine)  5,000 Units Subcutaneous 2 times per day   • [START ON 6/ MD  6/19/2019

## 2019-06-20 NOTE — CARDIAC REHAB
CARDIAC REHAB HEART FAILURE EDUCATION    Handouts provided and reviewed: CHF Booklet.      Activity: Chair for all meals: yes       Ambulation: in room with assist       Tolerated Activity:yes         Disease Process: Disease process reviewed with son and c

## 2019-06-20 NOTE — PROGRESS NOTES
[facility]    Progress Note    Urmila Montana Patient Status:  Inpatient    1925 MRN [id number]   Location [facility] 3W/SW Attending Christo Soto MD   Hosp Day # 1 PCP Fer Amaro MD       Subjective:   Fr 25 mg Oral Daily   • spironolactone  12.5 mg Oral Daily       Current PRN Inpatient Meds:      Atropine Sulfate, nitroGLYCERIN, Normal Saline Flush, Normal Saline Flush, acetaminophen, ondansetron HCl, acetaminophen, ALPRAZolam, ziprasidone (GEODON) IM in (cpt=71046)    Result Date: 6/20/2019  CONCLUSION:  1. Cardiomegaly with advanced CHF worse than September 29, 2018. 2. Atherosclerosis. 3. Kyphoscoliosis. 4. Demineralization. 5. Osteoarthritis. 6. Chronic appearing wedging of multiple vertebra.     Dictat

## 2019-06-20 NOTE — H&P
[facility]    PATIENT'S NAME: Zachery Mendoza   ATTENDING PHYSICIAN: Jackson Coughlin MD   PATIENT ACCOUNT#:   [id number]    LOCATION:  [address] [place] [address] 107 RECORD #:   [id number]       YOB: 1925  ADMISSION DATE:       06/19/20 97.6, pulse 55, respiratory rate 18, blood pressure 144/79, pulse ox 98% on room air. HEENT:  Atraumatic. Oropharynx clear. Moist mucous membranes. Normal hard and soft palate. Eyes:  Anicteric sclerae.   Pupils equal, round, reactive to light and acc

## 2019-06-20 NOTE — PROGRESS NOTES
[facility]    Progress Note    Honobia Demarcus Patient Status:  Inpatient    1925 MRN [id number]   Location [facility] 3W/SW Attending Shanique Montejo MD   Hosp Day # 1 PCP Laurie Gonzalez MD       Assessment and Kirk murmur  Abd: Abdomen soft, nontender,   Ext:  no clubbing, no cyanosis,no edema, wearing ELLIE hose bilat  Neuro: no focal deficits  Skin: no rashes or lesions    Scheduled Meds:   • furosemide  40 mg Intravenous BID   • Heparin Sodium (Porcine)  5,000 Units

## 2019-06-20 NOTE — SLP NOTE
ADULT SWALLOWING EVALUATION    ASSESSMENT    ASSESSMENT/OVERALL IMPRESSION:  Order received for BSSE, chart reviewed. Patient alert in BS chair upon arrival, caregiver present at [facility].   Patient pleasant, though very confused and tangential.  Lunch tray note Undetermined    HISTORY   MEDICAL HISTORY  Reason for Referral: R/O aspiration    Problem List  Active Problems:    Acute on chronic diastolic (congestive) heart failure [facility])    Past Medical History  Past Medical History:   Diagnosis Date   • Anxiety stat complaints consistent with possible esophageal involvement    GOALS  Goal #1 The patient will tolerate mech soft/CHOPPED consistency and THIN liquids without overt signs or symptoms of aspiration with 100 % accuracy over 2 session(s).   In Progress   Goal #

## 2019-06-20 NOTE — DIETARY NOTE
NUTRITION EDUCATION NOTE    Received consult for nutrition education for CHF with pt's caregiver. Appropriate education and handout(s) provided. See education section of Epic for specifics.     Jerod Couch RD,LDN  [phone]

## 2019-06-21 NOTE — CM/SW NOTE
06/21/19 1100   CM/SW Screening   Referral Source    Patient's Mental Status Confused;Memory Impairments   Patient's Home Environment [facility]   Number of Levels in Home 2   Patient Status Prior to Admission   Independent with ADLs and Mobi

## 2019-06-21 NOTE — PLAN OF CARE
Patient switched to PO lasix. Was discharged home with Phylicia Rubi, and caregiver. CHF discharge education provided and reviewed. Discharge CHF follow up appointment made. Patient will follow up with PCP and Dr. Galdino Stock in 2 weeks.          Problem: Pa output  - Evaluate effectiveness of vasoactive medications to optimize hemodynamic stability  - Monitor arterial and/or venous puncture sites for bleeding and/or hematoma  - Assess quality of pulses, skin color and temperature  - Assess for signs of decrea Reorient and redirection as needed  - Assess for the need to continue restraints  Outcome: Completed

## 2019-06-21 NOTE — HOME CARE LIAISON
Received referral from [address] [place] [address] [facility]. Spoke w/son Pavithra Done. He is agreeable to [facility], pending orders. Residential brochure provided with contact information. All questions addressed and answered.      Patient will need F2F home he

## 2019-06-21 NOTE — DISCHARGE SUMMARY
[facility]    Discharge Summary    Suzanna Mtz Patient Status:  Inpatient    1925 MRN [id number]   Location [facility] 3W/SW Attending Jamin Canas MD   Hosp Day # 2 PCP Sarina Fay MD     Date of Admi Hg.  Pulmonary artery pressures are similar compared to previous however  severe left sided diastolic dysfunction versus constrictive  physiology is new. Compared to the previous study these findings  are new. \"  -gave lasix IV BID - change to lasix 40mg P TROP <0.045 06/19/2019    CK 78 03/03/2017    B12 463 02/12/2019       Recent Labs   Lab 06/19/19  1633 06/20/19  0638 06/21/19  0616   RBC 3.51* 3.96 3.59*   HGB 10.2* 11.6* 10.4*   HCT 34.3* 38.1 33.9*   MCV 97.7 96.2 94.4   MCH 29.1 29.3 29.0   MCHC 90 tablet  Refills:  0     Metoprolol Succinate ER 25 MG Tb24  Commonly known as: Toprol XL      TAKE ONE TABLET BY MOUTH DAILY   Quantity:  30 tablet  Refills:  0     MULTIVITAL Tabs      Take 1 tablet by mouth daily.    Refills:  0     spironolactone 25

## 2019-06-21 NOTE — PROGRESS NOTES
[facility]    Progress Note    Ro Luke Patient Status:  Inpatient    1925 MRN [id number]   Location [facility] 3W/SW Attending Denisa Mattson MD   Hosp Day # 2 PCP Jelena Francisco MD         Assessment and no cyanosis,no edema  Neuro: Dementia  Skin: no rashes or lesions    Scheduled Meds:    • furosemide  40 mg Oral BID (Diuretic)   • ALPRAZolam  0.125 mg Oral QID   • Heparin Sodium (Porcine)  5,000 Units Subcutaneous 2 times per day   • aspirin  81 mg Oral

## 2019-06-25 NOTE — TELEPHONE ENCOUNTER
Gomez Mcmahon from [facility] stating that [facility] was started yesterday on her. . They are doing PT/OT/RN they want to know if you will sign off on the [facility] orders   Please call her at [phone]

## 2019-07-01 NOTE — PROGRESS NOTES
HPI:    Brandon Mullins is a 80year old female here today for hospital follow up.    She was discharged from Observation status in a hospital, [place] to Home   Admission Date: 6/19/19   Discharge Date: 6/21/19  Hospital Discharge Diagnoses (since 6/1/2 Oral Tab Take 1 tablet (0.25 mg total) by mouth 2 (two) times daily as needed. . (Patient taking differently: Take 0.125 mg by mouth 4 (four) times daily. Marly You )   Donepezil HCl 5 MG Oral Tab Take 1 tablet (5 mg total) by mouth once daily.    acetaminophen 500 denies hx of allergy or asthma    PHYSICAL EXAM:   No LMP recorded. Patient has had a hysterectomy. Estimated body mass index is 20.82 kg/m² as calculated from the following:    Height as of this encounter: 61.5\". Weight as of this encounter: 112 lb. aortic regurgitation, severe mitral and tricuspid regurgitation with left atrium dilated    (Z51.81,  Z79.899) Encounter for monitoring diuretic therapy  Plan: BASIC METABOLIC PANEL (8), MAGNESIUM            (R41.3) Memory disorder  Plan: Memory disorder s

## 2019-07-01 NOTE — PATIENT INSTRUCTIONS
Sterile saline to wound right arm twice daily  Feosol , MWF as tolerated  Desitin to sacral/ coccygeal area

## 2019-07-05 NOTE — TELEPHONE ENCOUNTER
Maru [facility] RN called to verify medication for patient. She states that the son is telling her that the patient is supposed to be taking Lasix 40mg in the am and 20mg at night. In Dr. Rigo Emerson notes and Dr. Lakhwinder Villatoro, it states Lasix 40mg BID.     Inf

## 2019-07-05 NOTE — TELEPHONE ENCOUNTER
Per Dr. Yvrose Dailey, patient is to be on Lasix 40 mg BID. Maru informed and verbalized understanding.

## 2019-07-08 NOTE — TELEPHONE ENCOUNTER
Spoke with Elpidio from [facility].  Needs to clarify pt's medication  Per Pt's son Reina Armenta pt is taking Furosimide 40mg in the AM and 20mg in the PM  Per notes and medication list. Pt is to take 40MG BID  Please advise           Spoke with Divine Oliveira, stated that jo

## 2019-07-18 NOTE — PROGRESS NOTES
Harry Larson is a 80year old female. Patient presents with: Follow - Up: pt in for 2 week follow up       HPI:      Patient appears well and is without specific complaints. She has a caretaker at home. She is pleasant.   No SOB, good appetite  N Rheumatoid arthritis ([facility] [address]    • Spinal stenosis    • Spinal stenosis in cervical region    • Visual impairment       Past Surgical History:   Procedure Laterality Date   • CATARACT EXTRACTION     • CHOLECYSTECTOMY     • COLONOSCOPY     • COLONOSCOPY     • 48/10/29   BASIC METABOLIC PANEL (8)   Result Value Ref Range    Glucose 104 (H) 70 - 99 mg/dL    Sodium 140 136 - 145 mmol/L    Potassium 3.6 3.5 - 5.1 mmol/L    Chloride 105 98 - 112 mmol/L    CO2 31.0 21.0 - 32.0 mmol/L    Anion Gap 4 0 - 18 mmol/L    B OTHER: Negative. CONCLUSION:  1. Cardiomegaly with advanced CHF worse than September 29, 2018. 2. Atherosclerosis. 3. Kyphoscoliosis. 4. Demineralization. 5. Osteoarthritis. 6. Chronic appearing wedging of multiple vertebra.     Dictated by (CST): NILDA this encounter       There are no Patient Instructions on file for this visit. No follow-ups on file.         Dinora Reed MD

## 2019-07-29 NOTE — TELEPHONE ENCOUNTER
gerri from [facility] left msg on nurse line, states pt has lump that comes and goes, hard to understand what site  please call back to discuss [phone]

## 2019-07-29 NOTE — TELEPHONE ENCOUNTER
Discussed with Parveen May the lump that comes and goes. Per Parveen May it is located on the left dorsal foot of the patient. Patient denies any pain.   Parveen May would like to know if MD was aware of this lump per last office visit of 07/18/19; and if there are a

## 2019-07-30 NOTE — TELEPHONE ENCOUNTER
Sabrina Gerber MD  You 8 minutes ago (1:39 PM)      I did not notice any lump on her foot last visit.  If it is not hot, red or swollen, it can likely be a cyst or fluid loculation.  I can see her anytime, but if it does not look infected I can fol

## 2019-08-30 NOTE — TELEPHONE ENCOUNTER
Marcos Arnulfo Son of patient is concerned for his mom. She is waking up all night. Is wanting to talk to a Doctor about this   Thank you       Mom is waking up 2;30 a.m. Presently taking Xanax 0.125 [id number] daytime and 0.5 at night.   To decrease Xanax to q 4 dwayne

## 2019-10-02 NOTE — TELEPHONE ENCOUNTER
Dylan Weller with [facility] would like orders to discharge patient on 10/10/19 or 10/11/19.  Please call with verbal.       Spoke with Dylan Weller, may discharge patient from [place] [id number]

## 2019-10-04 NOTE — ED PROVIDER NOTES
Patient Seen in: [facility] Emergency Department      History   Patient presents with:  Dyspnea MASOUD SOB (respiratory)    Stated Complaint:     HPI    This is a 68-year-old female who presents cold with blue fingertips and short of breath. With past m social history. Review of Systems    Positive for stated complaint:   Other systems are as noted in HPI. Constitutional and vital signs reviewed. All other systems reviewed and negative except as noted above.     Physical Exam     ED Triage components within normal limits   TROPONIN I - Normal   CBC WITH DIFFERENTIAL WITH PLATELET    Narrative: The following orders were created for panel order CBC WITH DIFFERENTIAL WITH PLATELET.   Procedure                               Abnormality Kristi Phillips MD                MDM     Placed on cardiac monitor, saline lock was established and oxygen 2 L by nasal cannula. She is found sitting upright in cart mildly tachypneic. She is unable to provide any history.   Patient was given Lasix 20 m

## 2019-10-04 NOTE — H&P
[facility]  History and Physical     Declan Richardson Patient Status:  Observation    1925 MRN [id number]   [facility] 8NE-A Attending Catalina Martin MD   Hosp Day # 0 PCP Dotty Queen MD     Chief Complaint: dyspn drugs. Family History:   Family History   Problem Relation Age of Onset   • Heart Disease Mother 72        CAD       Allergies: No Known Allergies    Medications:    No current facility-administered medications on file prior to encounter.    Current Outp rashes or lesions.    Psychiatric: Appropriate mood      Diagnostic Data:      Labs:  Recent Labs   Lab 10/04/19  1446   WBC 5.4   HGB 11.9*   MCV 91.4   .0       Recent Labs   Lab 10/04/19  1446   *   BUN 30*   CREATSERUM 1.04*   GFRAA 53*

## 2019-10-04 NOTE — PLAN OF CARE
Admission navigator completed by admission RN. Patient settled in bed, and orientated to room. Report given to accepting RN. Admission completed with son Gabriela Polo who states hes POA. Would like pt to be DNR. Pt is a/o x1.

## 2019-10-04 NOTE — TELEPHONE ENCOUNTER
HH called and would like to talk to Dr Yaquelin Casarez regarding patient's health   Chela Barnwell- [phone]    Addend:  Left message Jcarlos Rainey, that noted that patient was admitted into hospital, which was appropriate.

## 2019-10-04 NOTE — ED INITIAL ASSESSMENT (HPI)
From home with rpts of being hypoxic per home health RN.  Pt fingertips are blue and cold at this time

## 2019-10-04 NOTE — CONSULTS
[facility]  Cardiology Consultation    Ana Perez Patient Status:  Emergency    1925 MRN [id number]   Location [address] Attending Javier Lara, [address] [facility] Day # 0 PCP Emerita Blanchard MD     Reason for new.    History:  Past Medical History:   Diagnosis Date   • Anxiety state, unspecified    • Arrhythmia     A fib   • Congestive heart disease ([facility] [address]    • Dementia ([facility]    • Depression    • Hearing impairment    • High blood pressure    • High cholesterol demented  Skin: Cold LEs     Laboratory Data:  Lab Results   Component Value Date    WBC 5.4 10/04/2019    HGB 11.9 10/04/2019    HCT 37.1 10/04/2019    .0 10/04/2019    CREATSERUM 1.04 10/04/2019    BUN 30 10/04/2019     10/04/2019    K 4.1 1

## 2019-10-05 NOTE — CM/SW NOTE
Patient is current with residential [facility]--order for palliative care sent to residential via [facility]

## 2019-10-05 NOTE — PLAN OF CARE
Pt A&O x 1, confused, SPO2 98% on 4 L oxygen, A Fib on tele, up to recliner chair with total assist. Fall precautions, bed/chair alarm in place. Fluid restriction 1500 ml/day. Maintained on Lasix IVP 20 mg TID.  Troponin 0.098, patient denies any chest pain

## 2019-10-05 NOTE — PROGRESS NOTES
Patient not able to cough up secretions, attempted to suction orally, not able to. SPO2 98% on 4 L oxygen. Called respiratory therapist, attempted nasopharengeal suction- not successful.  Dr. Juliet Shaw notified, patient NPO, speech therapy eval. Page sent out

## 2019-10-05 NOTE — PROGRESS NOTES
Controlled a fib on telemetry. VSS. No c/o cardiac symptoms. On 2L O2 per nasal canula. Lung sounds with crackles, diminished. Lasix given as ordered. Purewick external catheter placed for accurate I&O's. BLE cold to touch.  Right toes deep purpl

## 2019-10-05 NOTE — HOME CARE LIAISON
This patient is current with [facility] services, RN/PT/OT. Will need resume orders prior to discharge. Thanks!

## 2019-10-05 NOTE — PHYSICAL THERAPY NOTE
PHYSICAL THERAPY EVALUATION - INPATIENT     Room Number: 1090/3517-B  Evaluation Date: 10/5/2019  Type of Evaluation: Initial  Physician Order: PT Eval and Treat    Presenting Problem: CHF  Reason for Therapy: Mobility Dysfunction and Discharge Plann risk    WEIGHT BEARING RESTRICTION  Weight Bearing Restriction: None                PAIN ASSESSMENT  Rating: Unable to rate  Location: R side, LEs  Management Techniques: Repositioning    COGNITION  · Orientation Level:  oriented to person  · Memory:  impa Assistance: Not tested  Distance (ft): 0                Skilled Therapy Provided: Pt received supine in bed and is initially agreeable to therapy. Pt performed AROM in supine. Pt following simple commands. Pt requests to use bathroom.  Pt encouraged to atte returning to prior to level of function. Recommend Pt D/C home with HHPT/24hr supervision, pending progress, in order to further address above mentioned impairments and functional mobility deficits in order to return to prior level of function.    DISCHARGE

## 2019-10-05 NOTE — PLAN OF CARE
Assumed care of pt at [address]. Pt alert to self only. Afib on tele. Saturating well on 4L NC. Pt with crackles in lungs bilaterally. Pt unable to follow instructions for coughing and deep breathing.  Pt continuously calling out and yelling, but does not reques

## 2019-10-05 NOTE — PROGRESS NOTES
[facility]  Progress Note     Otelia Kid Patient Status:  Inpatient    1925 MRN [id number]   [facility] 8NE-A Attending Kwame Cullen MD   Hosp Day # 0 PCP Talisha Hagan MD     Chief Complaint: CHF    S: ROTDYQ UOP  5. Cards following  2. HTN  1. Hold aldactone for now given labile BP  3. A. Fib  1. Rate stable  2. Not a candidate for AC  4. Dementia  5. DL  6. RA  7. Hyponatremia  1. Monitor with diuresis  2. Fluid restriction  3. 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  Met

## 2019-10-05 NOTE — PROGRESS NOTES
· [facility]      Subjective:  Up in chair coloring. Moaning with back pain that is better when up.   Reviewed with son    Objective:  116/60  Afebrile AFib 60s slower with sleep  I/O incomplete  BUN/cr 32/1.03  Na 128    Neuro: awake but conf

## 2019-10-05 NOTE — DIETARY NOTE
[facility]   CLINICAL NUTRITION    Heart Failure Education:  Pt chart reviewed d/t consult received per heart failure standing order. RD attempted to visit but pt was moaning in pain. Per MD note palliative/hospice evaluation agreed to by sonTangela finn

## 2019-10-06 NOTE — PLAN OF CARE
Patient is A&O to self. A fib on tele, controlled rates. Crackles auscultated bilaterally at bases. Patient unable to follow commands, a lot of coaching and distraction tactics being used. Suction PRN d/t audible secretions.  Patient unable/unwilling to cou

## 2019-10-06 NOTE — PROGRESS NOTES
[facility]  Progress Note     Avelina Juarez Patient Status:  Inpatient    1925 MRN [id number]   [facility] 8NE-A Attending Tonie Bejarano MD   Hosp Day # 1 PCP Brittni Whitmore MD     Chief Complaint: CHF    S: XZEJKF UOP  5. Cards following  2. HTN  1. Hold aldactone for now given labile BP  3. A. Fib  1. Rate stable  2. Not a candidate for AC  4. Poor extremity perfusion  1. Improving with diuresis  2. Likely due to low output HF  5. Dementia  1. Resume ativan   6.  DL

## 2019-10-06 NOTE — PROGRESS NOTES
[facility]  Progress Note    General Bc Patient Status:  Inpatient    1925 MRN [id number]   [facility] 8NE-A Attending Louisa Cleary MD   Hosp Day # 1 PCP Aleksey Oates MD       Assessment and Plan:  Patient Activ kg)   SpO2 99%   BMI 21.04 kg/m²     Temp (24hrs), Av.4 °F (36.3 °C), Min:96.7 °F (35.9 °C), Max:97.9 °F (36.6 °C)      Intake/Output:    Intake/Output Summary (Last 24 hours) at 10/6/2019 0892  Last data filed at 10/6/2019 0425  Gross per 24 hour   In MD  10/6/2019  8:40 AM

## 2019-10-06 NOTE — PROGRESS NOTES
10/06/19 1219   Clinical Encounter Type   Visited With Health care provider;Patient not available  (Patient has POLST in her chart that is awaiting doctor's signature.   Per nurse, patient was sleepy and not available for a  visit.)   Routine Vis

## 2019-10-06 NOTE — PLAN OF CARE
BSE completed this date.     Problem: Impaired Swallowing  Goal: Minimize aspiration risk  Description  Interventions:  - NPO  - frequent oral care to maintain oral hydration  - frequent suctioning to maintain patent airway   Outcome: Not Progressing

## 2019-10-06 NOTE — SLP NOTE
ADULT SWALLOWING EVALUATION    ASSESSMENT    ASSESSMENT/OVERALL IMPRESSION:  Brief Background Information: Pt is a 80 y.o. Female admitted 10/4/19 with dyspnea secondary to CHF. She presented with cold, blue fingertips and SOB.  Last hospitalized at [facility] absent swallow response with liquid pooling to floor of mouth. Limited oral awareness with decreased mouth opening, or manipulation of bolus. Absent swallow response.  Patient currently at high risk of aspiration of PO and her secretions and not appropriat NPO  Dysphagia History: June 20, 2019 SLP at [facility]: Patient with limited intake of trials despite encouragement due to cognition.   Self presented thin liquids via cup/straw (both single/consecutive intakes), puree via tsp x1, soft solid x1, hard solid x Status: Supplemental O2  Consistencies Trialed:  Thin liquids(via toothette)  Method of Presentation: Staff/Clinician assistance(toothette)  Patient Positioning: Upright;Midline    Oral Phase of Swallow: Impaired  Bolus Retrieval: Impaired  Bilabial Seal: I

## 2019-10-06 NOTE — PLAN OF CARE
Assumed pt care at 0730. Pt sitting up in the bed, and she's yelling into the hallway. Offered coloring book to pt for distraction, and pt agreed to use. No s/s of acute distress noted at this time . VSS. Pt denies any pain at this time.  Al mobility daily  - Provide frequent reorientation  - Promote wakefulness i.e. lights on, blinds open  - Promote sleep, encourage patient's normal rest cycle i.e. lights off, TV off, minimize noise and interruptions  - Encourage family to assist in orientati

## 2019-10-06 NOTE — PROGRESS NOTES
10/05/19 7542   Clinical Encounter Type   Visited With Patient and family together  ( gave POLST form to patient's adult son to complete. It had been done once before but document had been misplaced.   They want a new POLST to put on chart here

## 2019-10-07 PROBLEM — Z51.5 PALLIATIVE CARE ENCOUNTER: Status: ACTIVE | Noted: 2019-01-01

## 2019-10-07 PROBLEM — Z71.89 GOALS OF CARE, COUNSELING/DISCUSSION: Status: ACTIVE | Noted: 2019-01-01

## 2019-10-07 NOTE — PROGRESS NOTES
[facility]  Progress Note     Margarita Carrillo Patient Status:  Inpatient    1925 MRN [id number]   [facility] 8NE-A Attending Zen Horan MD   Hosp Day # 2 PCP Yasmin Manriquez MD     Chief Complaint: CHF    S: Patien tablet Oral Daily   • furosemide  20 mg Intravenous TID   • Heparin Sodium (Porcine)  5,000 Units Subcutaneous Q8H [address]       ASSESSMENT / PLAN:     1. Acute decompensated diastolic CHF  1. Diurese   2. Strict I/O  3. Daily wts  4. Cards following  2.  HTN

## 2019-10-07 NOTE — PHYSICAL THERAPY NOTE
PHYSICAL THERAPY TREATMENT NOTE - INPATIENT    Room Number: 2377/3028-T     Session: 1   Number of Visits to Meet Established Goals: Trial    Presenting Problem: CHF     History related to current admission: Pt is a 80 y.o.  Female admitted 10/4/19 with dy INPATIENT SHORT FORM - BASIC MOBILITY  How much difficulty does the patient currently have. ..  -   Turning over in bed (including adjusting bedclothes, sheets and blankets)?: Unable   -   Sitting down on and standing up from a chair with arms (e.g., wheelc impairments and comorbidities manifest themselves as functional limitations in independent bed mobility, transfers, and gait. Palliative care has been consulted.  Will continue to follow pt for trial basis to determine if able to participate and progress to

## 2019-10-07 NOTE — OCCUPATIONAL THERAPY NOTE
OCCUPATIONAL THERAPY QUICK EVALUATION - INPATIENT    Room Number: 9147/6647-L  Evaluation Date: 10/7/2019     Type of Evaluation: Quick Eval  Presenting Problem: dyspnea    Physician Order: IP Consult to Occupational Therapy  Reason for Therapy:  ADL/IADL ASSESSMENT  Rating: Unable to rate          COGNITION  impaired    RANGE OF MOTION AND STRENGTH ASSESSMENT  Upper extremity ROM is within functional limits except for the following:  unable to follow instructions    Upper extremity strength is within funct profile noted above. Functional outcome measures completed include AM-PAC, ROM, and MMT. Pt is performing ADL with assistance. Recommend discharge back home with 24 hr caregiver.      Patient Complexity  Occupational Profile/Medical History  LOW - Brief his

## 2019-10-07 NOTE — PROGRESS NOTES
Palliative Care Consult request from Dr. Chen Garcia noted requesting discussion for goals of care. Discussed consultation request with patient's sonTerry ([phone]) via phone today. Family meeting planned for today at 1400 to meet in the patient's room.  Te

## 2019-10-07 NOTE — PROGRESS NOTES
[facility]  Cardiology Progress Note    Reubens Enderlin Patient Status:  Inpatient    1925 MRN [id number]   [facility] 8NE-A Attending Ruby Beverly MD   Hosp Day # 2 PCP Clau Mejias MD     Subjective:  Denies chest WILLIAMS Owusu  10/7/2019  10:20 AM      =======================================================  Patient seen and examined independently. Note reviewed and labs reviewed. Agree with above assessment and plan. Sleepy.   CV exam, irregular, irregular, S1S2,

## 2019-10-07 NOTE — SLP NOTE
SPEECH DAILY NOTE - INPATIENT    ASSESSMENT & PLAN   ASSESSMENT  Patient seen for re-evaluation of her swallow skills this morning. Patient sleeping upon arrival and awoken easily, but quickly falls back asleep. Needed constant cues to maintain alertness.

## 2019-10-07 NOTE — PLAN OF CARE
Re-evaluation completed this date.      Problem: Impaired Swallowing  Goal: Minimize aspiration risk  Description  Interventions:  - NPO  - frequent oral care to maintain oral hydration  - frequent suctioning to maintain patent airway   Outcome: Not Progres

## 2019-10-07 NOTE — CM/SW NOTE
10/07/19 0800   CM/SW Screening   Referral Source Social Work (self-referral)   [address] staff; Chart review   Patient's Mental Status Alert   Patient's [address]   Number of Levels in Home 1   Patient lives with Caregiver   Pa

## 2019-10-07 NOTE — CONSULTS
[address] [facility]  [id number]  Hospital Day #2  Date of Consult: 10/07/19  Patient seen at: [facility]    Reason for Consultation:      Consult requested by Dr. Greg Bella for evalu SOB  Cough: yes, having difficulty clearing throat  Nausea: denies  Pain: reporting sore bottom sitting up in chair    Review of pertinent medication requirements in past 24 hours:   Scheduled ativan Q8hrs, 0.25mg given 0900, decreased to 0.126mg  Haldol l 10/07/2019    K 3.9 10/07/2019    CL 98 10/07/2019    CO2 22.0 10/07/2019    GLU 76 10/07/2019    CA 8.7 10/07/2019    ALB 3.4 10/04/2019    ALKPHO 156 (H) 10/04/2019    BILT 0.8 10/04/2019    TP 7.7 10/04/2019    AST 43 (H) 10/04/2019    ALT 28 10/04/2019 confused   50 Mainly sit/lie Extensive Disease  Can't do any work   Partial Assist Normal or Reduced Full or confused   40 Mainly in bed Extensive Disease  Can't do any work Mainly Assist Normal or Reduced Full or confused   30 Bedbound Extensive Disease care)     Palliative Care Services:  1) Usually visit once per 2-3 weeks  2) Perform GOC discussions  3) Follow up on symptom management    I discussed the benefits of palliative care to include assistance with arising symptom management needs, extra layer sections B or C completed and it was not yet signed by a provider. I informed them that we will hold off on completing the document until they decide about hospice vs PC. They were in agreement.      HCPOA: Completed 10/5/2018 noting her wishes for \"stayin decision on hospice  4. Healthcare POA: on file in EMR  5.  Respiratory congestion/secretions: requested RN to call RT for treatment now and PRN to assist with loosening secretions    A total of  70 minutes were spent on this consult; >50% was spent

## 2019-10-07 NOTE — PROGRESS NOTES
Problem: Impaired Functional Mobility  Goal: Achieve highest/safest level of mobility/gait  Description  Interventions:  - Assess patient's functional ability and stability  - Promote increasing activity/tolerance for mobility and gait  - Educate and engag assist.  Suction as needed. Fall precaution. Palliative meeting this afternoon. IV lasix as order. Npo due to failrd swallow eval.  Will continue supportive care for now.

## 2019-10-07 NOTE — CM/SW NOTE
Received call from son myah () regarding plan of care for his mother--wishes to speak with 'a doctor . . . No one has updated him or the family with what's going on with his mom.'  Page out to dr Mendel Chandler.   Edison, concerned that his 'mom

## 2019-10-07 NOTE — PLAN OF CARE
Assumed care of patient at [address] [facility]. Monitor tele-Afib,  on 2L per nasal cannula. Pt maintained strict NPO. HOB elevated. Pt has moist weak cough, suction PRN. Purewick in place for output. Monitor daily weight. IV rocephin. Turn reposition, oral care.  Fall

## 2019-10-08 NOTE — SLP NOTE
SPEECH DAILY NOTE - INPATIENT    ASSESSMENT & PLAN   ASSESSMENT  Pt seen this AM for reassessment of swallow at bedside. RN reported improved alertness. Pt cooperative, pleasant, and alert. No family or caregivers present at bedside.  Pt responded to verbal

## 2019-10-08 NOTE — PROGRESS NOTES
[facility] Note:  Renal Adjustment for cefazolin (ANCEF)    Janel Jefferson is a 80year old female who has been prescribed cefazolin (ANCEF) 1000 mg every 8 hrs x 2 days  CrCl is estimated creatinine clearance is 32.8 mL/min (based on SCr of 0.88 mg/d

## 2019-10-08 NOTE — PROGRESS NOTES
Pt passed bedside eval- will start feedings again- plan video swallow in am   Change nebs to scheduled to help clear secretions.   Try to avoid sedation   Zuly Gonzales NP

## 2019-10-08 NOTE — PROGRESS NOTES
[address] Follow Up    Brandon Mullins  [id number]  Patient seen at: [address] [facility]:      States she's tired but willing to get up with PT today. Patient seen and evaluated, no family at bedside.      Re Chemistry:  Lab Results   Component Value Date    CREATSERUM 0.88 10/07/2019    BUN 28 (H) 10/07/2019     (L) 10/07/2019    K 3.9 10/07/2019    CL 98 10/07/2019    CO2 22.0 10/07/2019    GLU 76 10/07/2019    CA 8.7 10/07/2019    ALB 3.4 10/04/2 Partial Assist Normal or Reduced Full or confused   40 Mainly in bed Extensive Disease  Can't do any work Mainly Assist Normal or Reduced Full or confused   30 Bedbound Extensive Disease  Can't do any work Max Assist  Total Care Reduced Drowsy/confused   2 Spinal stenosis     Spinal stenosis in cervical region     Radiculopathy     Rheumatoid arthritis involving multiple sites with positive rheumatoid factor (HCC)     Memory disorder     Chronic diarrhea     Chronic systolic heart failure ([address]     Hypertens

## 2019-10-08 NOTE — PLAN OF CARE
Pt more alert than she was yesterday. Ate well for lunch with no coughing noted  Cont on IV lasix. On ancef for UTI   Plan of care discussed with pt and son at bedside,. Verbalized understanding. Call light within reach, will cont to monitor. Goal  Description  Patient's Short Term Goal: Improve strength   Interventions:   - PT/OT   - See additional Care Plan goals for specific interventions   Outcome: Progressing     Problem: Delirium  Goal: Minimize duration of delirium  Description  Interven

## 2019-10-08 NOTE — PROGRESS NOTES
[facility]  Cardiology Progress Note    Marina Maldonado Patient Status:  Inpatient    1925 MRN [id number]   [facility] 8NE-A Attending Pernell Castleman, MD   Hosp Day # 3 PCP Allison Ochoa MD     Subjective:  No cardiac co DNR, palliative following       Discussed plan of care with patient and nursing. WILLIAMS Art  10/8/2019  11:33 AM    =======================================================  Patient seen and examined independently.   Note reviewed and labs reviewe

## 2019-10-08 NOTE — RESPIRATORY THERAPY NOTE
RN called for PRN tx. Pt saturating at 100% on 3lpm nc. BS are coarse bilaterally. Encourage use of flutter. Encourage coughing.

## 2019-10-08 NOTE — PLAN OF CARE
Pt is alert to self, on 2L of O2, pt is A-fib on the monitor. Pt is up with 2 assist, pt has a purewick overnight, incontinent of bowel. All needs met and will continue to monitor. PLAN: maintain NPO until speech eval, pt to reassess.        POC: Disc

## 2019-10-08 NOTE — PROGRESS NOTES
[facility]  Progress Note     Avelina Juarez Patient Status:  Inpatient    1925 MRN [id number]   [facility] 8NE-A Attending Tonie Bejarano MD   Hosp Day # 3 PCP Brittni Whitmore MD     Chief Complaint: CHF    S: EAHPTS Daily   • furosemide  20 mg Intravenous TID   • Heparin Sodium (Porcine)  5,000 Units Subcutaneous Q8H [address]       ASSESSMENT / PLAN:     1. Acute decompensated diastolic CHF  1. Diurese   2. Strict I/O  3. Daily wts  4. Cards following  2. HTN  1.  Stable  3

## 2019-10-09 NOTE — PLAN OF CARE
Patient received on 2 liter nasal cannula sating 97%. Breath sounds- diminished/crackles RLL. Plan to continue nebs as ordered.

## 2019-10-09 NOTE — VIDEO SWALLOW STUDY NOTE
ADULT VIDEOFLUOROSCOPIC SWALLOWING STUDY    Admission Date: 10/4/2019  Evaluation Date: 10/09/19  Radiologist: Dr. Thais Buitrago   Diet Recommendations - Solids: Soft diet  Diet Recommendations - Liquid: Honey thick    Further Follow-up:  Follow Up Alertness: Lethargic; Constant cues requied to stay on task. Consistencies Presented: Nectar thick liquids; Honey thick liquids;Puree;Hard solid to assess oropharyngeal swallow function and assess for compensatory strategies to improve safe swallow function Hard Solid):  Impaired  Triggered at: Base of tongue  Residue Severity, Location: Mild;Valleculae  Cleared/Reduced with: Secondary swallow(spontaneous)  Laryngeal Penetration: None  Tracheal Aspiration: None  Strategy(ies) Implemented (Hard Solid): Small bi 1-2 session(s). In Progress   Goal #2 The patient/family/caregiver will demonstrate understanding and implementation of aspiration precautions and swallow strategies independently over 1-2 session(s).     Not Addressed   Goal #3 The patient will utilize co

## 2019-10-09 NOTE — PROGRESS NOTES
[facility]  Progress Note     Jonas Bassam Patient Status:  Inpatient    1925 MRN [id number]   [facility] 8NE-A Attending Jessica Katz MD   Hosp Day # 4 PCP Sanjay Andres MD     Chief Complaint: CHF    S: Patient Metoprolol Succinate ER  25 mg Oral Daily   • aspirin EC  81 mg Oral Daily   • multivitamin  1 tablet Oral Daily   • furosemide  20 mg Intravenous TID   • Heparin Sodium (Porcine)  5,000 Units Subcutaneous Q8H [address]       ASSESSMENT / PLAN:     1.  Acute deco

## 2019-10-09 NOTE — PROGRESS NOTES
[facility]  Cardiology Progress Note    Suzanna Mtz Patient Status:  Inpatient    1925 MRN [id number]   [facility] 8NE-A Attending Kayla Rider MD   Hosp Day # 4 PCP Sarina Fay MD     Subjective:  No cardiac com controlled. not on [facility] w/ falls  · Mild troponin elevation unlikely ACS as outlined  · Likely aspirating per RN/RT - passed video swallow- plan for honey thick liquids   · Advanced dementia.    · Hyponatremia, resolved.  Na 138 today  · UTI on Ancef     Plan

## 2019-10-09 NOTE — PALLIATIVE CARE NOTE
0930am:  I stopped by unit to visit with Valdo Jackson as she was leaving the unit with transport for VFSS. 234pm: Received call from pt's son/RADHA Moran requesting clinical update.  I reviewed results of today's VFSS (see SLP note), with SLP recs for modified

## 2019-10-09 NOTE — PLAN OF CARE
Received patient, alert but confused and cooperative. Transferred to bed with a total lift. Patient screams/yells at times. Discussed POC. Due meds given. Aspiration precaution maintained. Safety measures reinforced, call light within reach.  Turned and rep Outcome: Progressing  Goal: Patient/Family Short Term Goal  Description  Patient's Short Term Goal: Improve strength   Interventions:   - PT/OT   - See additional Care Plan goals for specific interventions   Outcome: Progressing     Problem: Delirium  Go

## 2019-10-10 NOTE — CM/SW NOTE
STACEY order for community palliative care. Pt will d/c to [facility] today. SW sent them the order and confirmed that they can arrange the [facility] follow up.

## 2019-10-10 NOTE — PHYSICAL THERAPY NOTE
PHYSICAL THERAPY TREATMENT NOTE - INPATIENT    Room Number: 1513/3314-I     Session: 2  Number of Visits to Meet Established Goals: Trial    Presenting Problem: CHF     History related to current admission: Pt is a 80 y.o.  Female admitted 10/4/19 with dys AM-PAC '6-Clicks' INPATIENT SHORT FORM - BASIC MOBILITY  How much difficulty does the patient currently have. ..  -   Turning over in bed (including adjusting bedclothes, sheets and blankets)?: Unable   -   Sitting down on and standing up from a ry refuse standing trial. Pt continues to present with cognitive impairments, strength deficits, poor trunk control, balance impairments, and decreased activity tolerance.  These impairments manifest themselves as functional limitations in bed mobility, transf

## 2019-10-10 NOTE — CM/SW NOTE
Received call from patient's daughter in law sammi  regarding discharge planning. Family feels that patient would benefit from NOEMI stay--had been to [facility] in [facility] [address] with a positive experience.   Family would like a referral sent to [place]

## 2019-10-10 NOTE — PLAN OF CARE
Problem: Impaired Swallowing  Goal: Minimize aspiration risk  Description  Interventions:  [facility] soft diet with Honey thick liquids;  Consider downgrade back to Puree if difficulty with chopped consistencies noted    - Patient should be alert and upright

## 2019-10-10 NOTE — PROGRESS NOTES
[facility]OSPITAL[facility]  Progress Note     Duncan Toledo Patient Status:  Inpatient    1925 MRN [id number]   [facility] 8NE-A Attending Carson Pinzon MD   Hosp Day # 5 PCP Martha Argueta MD     Chief Complaint: CHF    S: Patient <0.045 0.098*          Imaging: Imaging data reviewed in Epic.     Medications:   • furosemide  40 mg Oral BID (Diuretic)   • ipratropium-albuterol  3 mL Nebulization Q6H WA   • spironolactone  12.5 mg Oral Daily   • Metoprolol Succinate ER  25 mg Oral Radha Ms. Saulo Browne is expected to be discharge to: home    Plan of care discussed with patient RN  at bedside. holli Flowers NP       As above, ok to dc from my standpoint.     Darrell Harris MD  [facility]

## 2019-10-10 NOTE — PLAN OF CARE
Pt. Alertx1, resting in chair comfortably. Denies pain. VSS. Pt. Saturating well on 1L, attempting to wean O2. A fib on tele, rate in the 60's. RW IV flushing well, saline locked. Pt. Is up with total lift, PT working with her this morning.  Pt. Updated on Progressing  Goal: Patient/Family Short Term Goal  Description  Patient's Short Term Goal: Improve strength   Interventions:   - PT/OT   - See additional Care Plan goals for specific interventions   Outcome: Progressing     Problem: Delirium  Goal: Carlos De La Cruz oxygenation and minimize respiratory effort  - Oxygen supplementation based on oxygen saturation or ABGs  - Provide Smoking Cessation handout, if applicable  - Encourage broncho-pulmonary hygiene including cough, deep breathe, Incentive Spirometry  - Asses

## 2019-10-10 NOTE — PLAN OF CARE
Assumed pt care around 1130. Assessment unchanged. Pt education provided on medication and POC. Pt verbalized understanding. All needs met. Will continue to monitor.     POC: d/c to [facility]EMI once facility is available, referral out to [facility]

## 2019-10-10 NOTE — SLP NOTE
SPEECH DAILY NOTE - INPATIENT    ASSESSMENT & PLAN   ASSESSMENT  Pt seen for dysphagia tx to assess tolerance with recommended diet, ensure proper utilization of aspiration precautions and provide pt/family education. No family present.  Pt in seated uprigh In Progress   Goal #2 The patient/family/caregiver will demonstrate understanding and implementation of aspiration precautions and swallow strategies independently over 1-2 session(s).     Not Addressed as Pt unable to comprehend and no family present; d/w

## 2019-10-10 NOTE — PROGRESS NOTES
[address] [facility] Follow Up    Elvis Sanchez  [id number]  Patient seen at: [address] [facility]:      Asking for her lunch. States she has some discomfort in her stomach but unable to describe.  Sons have stated that sh 24 (H) 10/09/2019     10/09/2019    K 4.6 10/09/2019     10/09/2019    CO2 29.0 10/09/2019     (H) 10/09/2019    CA 8.8 10/09/2019    ALB 3.4 10/04/2019    ALKPHO 156 (H) 10/04/2019    BILT 0.8 10/04/2019    TP 7.7 10/04/2019    AST 43 ( (36.5 °C) (Oral)   Resp 17   Ht 5' 4\" (1.626 m)   Wt 115 lb 1.3 oz (52.2 kg)   SpO2 98%   BMI 19.75 kg/m²     General: awake and in no physical distress. Body habitus: Thin   HEENT: Normocephalic, sclera anicteric, no injection.  Oral mucous membranes steph Planning counseling and discussion:    POLST/CODE STATUS: I attempted to contact her son/1st Charly Hope in response to the noted plan from Andrea Lane for family's request for NOEMI but his voicemail box was full.  I spoke to her DIL (Stephan's wife) Bobby Presybeterian to bashirr failure ([address]     Hypertension, well controlled     Diastolic dysfunction     Pulmonary valve insufficiency     Chronic bilateral pleural effusions     Other neutropenia (HCC)     Benign neoplasm of choroid     Diplopia     Nonexudative senile macular dege

## 2019-10-10 NOTE — PLAN OF CARE
Pt confused, up in chair this am and went to video swallow test. Soft diet with honey thick is recommended. Pt is confused and she calls out all the time. Tolerated diet with son at bedside. She took potassium po x 2.

## 2019-10-10 NOTE — PLAN OF CARE
Pt discharged to [facility]. IV removed, tele dc'd and returned to monitor tech. F/U instructions provided and discussed. Pt and family v/u. Rx given. Report called to Giraffe Friend at [facility].  Discussed adverse reactions and side effects of all new m

## 2019-10-10 NOTE — PLAN OF CARE
Received patient on bed, alert, confused and yelling. Denied pain, denied SOB. Discussed POC. Due meds given. Aspiration precaution maintained. Turned and repositioned. Incontinence care done, Purewick changed.  Safety measures reinforced, call light within Outcome: Progressing  Goal: Patient/Family Short Term Goal  Description  Patient's Short Term Goal: Improve strength   Interventions:   - PT/OT   - See additional Care Plan goals for specific interventions   Outcome: Progressing     Problem: Delirium  Go facilitate oxygenation and minimize respiratory effort  - Oxygen supplementation based on oxygen saturation or ABGs  - Provide Smoking Cessation handout, if applicable  - Encourage broncho-pulmonary hygiene including cough, deep breathe, Incentive Spiromet

## 2019-10-10 NOTE — CM/SW NOTE
[facility] unable to accept patient but sister Quorum Healthe in [facility] presented as option to daughter in law sammi--in agreement. Attempted to confirm plan with sammi and cj glasgow--messages left, await call back.   Pt tentatively assigned t

## 2019-10-10 NOTE — PROGRESS NOTES
[facility]  Cardiology Progress Note    Otelia Kid Patient Status:  Inpatient    1925 MRN [id number]   [facility] 8NE-A Attending Randall Aschoff, MD   Hosp Day # 5 PCP Talisha Hagan MD     Subjective:  More vocal tod Abdomen: Soft, non-tender. Extremities: BLE edema, decreased today   Skin: Warm and dry.      Medications:  • furosemide  40 mg Oral BID (Diuretic)   • ipratropium-albuterol  3 mL Nebulization Q6H WA   • spironolactone  12.5 mg Oral Daily   • Metoprolol

## 2019-10-10 NOTE — PLAN OF CARE
Assumed pt care around 1130. Assessment unchanged. Pt education provided on medication and POC. Pt verbalized understanding. All needs met. Will continue to monitor.     POC: d/c to NOEMI once facility is available, referral out to [facility]    1500- gave re

## 2019-10-11 NOTE — DISCHARGE SUMMARY
[facility]OSPITAL[facility]  DISCHARGE SUMMARY     Ro Luke Patient Status:  Inpatient    1925 MRN [id number]   [facility] 8NE-A Attending No att. providers found   Hosp Day # 5 PCP Jelena Francisco MD     Date of Admission: 10/4/ fingertips. She has cool extremities. Work-up  is consistent with acute heart failure. Bedside echocardiogram done by cardiology does not show severely depressed ejection fraction. Brief Synopsis:   Patient was admitted seen by cardiology.   Started o agitation  • Patient high fall risk  • For perfusion to her extremities at time of admission    Lab/Test results pending at Discharge:   · None    Consultants:  • Cardiology, palliative care, PT OT speech social work    Discharge Medication List:     Disch Bring a paper prescription for each of these medications  · ALPRAZolam 0.25 MG Tabs  · ALPRAZolam 0.5 MG Tabs         Follow-up appointment:   Balta [address]  [facility] [address]  [address]  [phone]    On 10/16/2019  Card

## 2019-10-14 NOTE — TELEPHONE ENCOUNTER
Called patient, his mailbox was full, unable to leave message. Did speak to him during the week regarding his mother. Also called other contact, Asad Mccauley. Left message on his voicemail to call our office back if needed.

## 2019-11-19 NOTE — TELEPHONE ENCOUNTER
Patient's Son called Blanka Gross) he Left a Message on the nurse line. Wanted to talk to you about mom she was just diagnosed with pneumonia and would like to talk to you personally about it.       Addend:  Called patient, left message left message

## 2019-12-04 NOTE — TELEPHONE ENCOUNTER
Requested Prescriptions     Pending Prescriptions Disp Refills   • ALPRAZolam 0.25 MG Oral Tab 30 tablet 0     Sig: Take 1 tablet (0.25 mg total) by mouth 2 (two) times daily as needed.  .     Last office visit: 7-18-19  Medication last refilled: 10-10-19

## 2019-12-11 PROBLEM — I78.0 HEREDITARY HEMORRHAGIC TELANGIECTASIA (HCC): Status: ACTIVE | Noted: 2019-01-01

## 2019-12-11 PROBLEM — I78.0 HEREDITARY HEMORRHAGIC TELANGIECTASIA (HCC): Status: RESOLVED | Noted: 2019-01-01 | Resolved: 2019-01-01

## 2019-12-11 PROBLEM — E08.00 DIABETES MELLITUS DUE TO UNDERLYING CONDITION WITH HYPEROSMOLARITY WITHOUT COMA, WITHOUT LONG-TERM CURRENT USE OF INSULIN (HCC): Status: ACTIVE | Noted: 2019-01-01

## 2019-12-11 NOTE — TELEPHONE ENCOUNTER
Rodolfo Martin calling with questions regarding Lasix. Please call. Addend: Discussed with Rodolfo Martin.   Patient does not eating much or drinking much, and has been off of Lasix since returning from the hospital.  This will have to be followed, though will avoid [facility]

## 2019-12-11 NOTE — TELEPHONE ENCOUNTER
Spoke with pt's son Giovanna Coffey, wants to know if pt needs to be on Lasix? If she maguire she needs rx send to [facility].  thanks

## 2019-12-11 NOTE — PROGRESS NOTES
Harry Larson is a 80year old female. Patient presents with:  Physical: pt in for CPX        HPI:   Harry Larson is a 80year old female who presents for a Medicare Annual Wellness visit.     Patient Active Problem List:     CHF (congestive hea your current health state?: Poor    How do you maintain positive mental well-being?: Social Interaction; Visiting Family    If you are a male age 38-65 or a female age 47-67, do you take aspirin?: No    Have you had any immunizations at another office such Medicare Assessment section in Charting, test patient and document. Then, refresh your progress note to see your input here.   Cognitive Assessment     What day of the week is this?: (pt has Dementia)    What month is it?: (pt has Dementia)    What year Family History   Problem Relation Age of Onset   • Heart Disease Mother 72        CAD      SOCIAL HISTORY:   Social History    Tobacco Use      Smoking status: Never Smoker      Smokeless tobacco: Never Used    Alcohol use: No    Drug use: No    Occ: tenderness  BREAST: Not done per request of son  LUNGS: Poor inspiratory effort, decreased breath sounds right lower lobe with some dullness to percussion  CARDIO: heart rate irregular, S1-S2 normal, grade 3/6 systolic murmur  GI: good BS's, no masses, HSM pleural effusion.    (M05.79) Rheumatoid arthritis involving multiple sites with positive rheumatoid factor ([facility] [address]  Plan:   Clinically stable    (R30.0) Dysuria  Plan: URINE CULTURE, ROUTINE, [facility]IDENTIAL HOME HEALTH        REFERRAL       To check urinalysis Colorectal Cancer Screening      Colonoscopy Screen every 10 years There are no preventive care reminders to display for this patient.  Update Health Maintenance if applicable    Flex Sigmoidoscopy Screen every 10 years No results found for this or any pr 3.9    No flowsheet data found. Creatinine  Annually Creatinine (mg/dL)   Date Value   10/09/2019 0.96    No flowsheet data found. BUN  Annually BUN (mg/dL)   Date Value   10/09/2019 24 (H)    No flowsheet data found.      Drug Serum Conc  Annually No (49329)                          10/01/2018      Fluzone Vaccine Medicare ()                          10/01/2018  10/10/2019      Pneumococcal (Prevnar 7)                          11/01/2015      Pneumovax 23          01/01/2004      Td, Preserv Free

## 2019-12-13 NOTE — TELEPHONE ENCOUNTER
Upon discussing with Dr. Radha Dunne, RN called Routt Wilbert back. Informed her that they may obtain the urine sample/culture via straight cath.   To also draw the following labs: Pro BNP, TSH, Lipid, Urine culture, B12, Vit D, CMP, CBC, and Folic Acid (see order

## 2019-12-13 NOTE — TELEPHONE ENCOUNTER
Tricia from [facility] is requesting orders for Ms. Stallings,one for nursing, another one for speech therapy and another one to see a dietitian. Kaylynn Ram states patient presents skin tears, bruising in some parts of her body, a nurse will evaluate patient.

## 2019-12-18 NOTE — TELEPHONE ENCOUNTER
FYI:    Skin tear rt upper arm + stage 1 left upper hip area and stage 2 on RT upper hip area. Pt has total of 8 wounds. Will reevaluate wounds next week.  Will get blood drawn tomorrow morning      Addendum: Tricia called, message left  Advised rosendo

## 2019-12-18 NOTE — TELEPHONE ENCOUNTER
Kit Zaman called to let the Dr know that last week pt had 5 open wounds on R and L arms, and pressure sores on both heels. Now additional this week there is three sores on her body. Please give Rubi a call for further details.

## 2019-12-23 PROBLEM — I50.9 HEART FAILURE (HCC): Status: ACTIVE | Noted: 2019-01-01

## 2019-12-23 PROBLEM — J96.00 ACUTE RESPIRATORY FAILURE (HCC): Status: ACTIVE | Noted: 2019-01-01

## 2019-12-23 PROBLEM — Z71.89 ADVANCE CARE PLANNING: Status: ACTIVE | Noted: 2019-01-01

## 2019-12-23 PROBLEM — I50.9 ACUTE ON CHRONIC CONGESTIVE HEART FAILURE, UNSPECIFIED HEART FAILURE TYPE (HCC): Status: ACTIVE | Noted: 2019-01-01

## 2019-12-23 PROBLEM — R79.89 AZOTEMIA: Status: ACTIVE | Noted: 2019-01-01

## 2019-12-23 PROBLEM — R73.9 HYPERGLYCEMIA: Status: ACTIVE | Noted: 2019-01-01

## 2019-12-23 NOTE — ED INITIAL ASSESSMENT (HPI)
Pt arrived via [facility] accompanied by caregiver. Not much information provided by caregiver. Reported shortness of breath. Family at the bedside. Pt moaning/pain. On non-rebreather.

## 2019-12-23 NOTE — PLAN OF CARE
Pt here with CHF. Admitted to hospice. Pt non-verbal. Pt moans and cries out. Pt given ativan x1 and remains calm. Pt has increase secretions. Right ear scopolamine patch in place. Secretions have cleared at this time. L arm PIV-SL. Heels elevated.  Mepi patient/family as appropriate  - Emphasize sustaining relationships within family system and community, or mirta/spiritual traditions  - Initiate Spiritual Care consult as needed  Outcome: Progressing     Problem: CHANGE IN BODY IMAGE  Goal: Pt/Family comm hearing, underlying metabolic abnormalities, dehydration, psychiatric diagnoses, and notify attending MD/LIP  - [place] high risk fall precautions, as indicated  - Provide frequent short contacts to provide reality reorientation, refocusing and direction setting prior to admitting patient removing all contraband  - Remove all personal property per policy  Outcome: Progressing     Problem: SPIRITUAL CARE  Goal: Pt/Family able to move forward in process of forgiving self, others and/or higher power  Descript life  - Refer patient to mirta community for assistance, as appropriate  - Respond to patient/family need for prayer/ritual/sacrament/ceremony  Outcome: Progressing

## 2019-12-23 NOTE — HOSPICE RN NOTE
[facility] met with four sons to discuss Hospice and at this time the boys are ready to move forward with Comfort Measures. They want the patient to be comfortable and possibly get home in the future.  POC was discussed with Dr Maynor Reina and with Claudette Miller

## 2019-12-23 NOTE — H&P
[address] [facility],  [address] Patient Status:  Inpatient    1925 MRN [id number]   Location [facility] 1W Attending Kimberly Arce MD   Hosp Day # 0 PCP Asmita Kilgore MD     Date:  2019 CAD     Social History:  Social History    Tobacco Use      Smoking status: Never Smoker      Smokeless tobacco: Never Used    Alcohol use: No    Drug use: No    Allergies/Medications:    Allergies: No Known Allergies  ALPRAZOLAM 0.5 MG Oral Tab, TAKE ONE 12/23/2019    BUN 36 (H) 12/23/2019     (L) 12/23/2019    K 4.9 12/23/2019     12/23/2019    CO2 32.0 12/23/2019     (H) 12/23/2019    CA 8.2 (L) 12/23/2019    ALB 2.2 (L) 12/19/2019    ALKPHO 114 12/19/2019    BILT 0.7 12/19/2019    TP Disposition–plan hospice meeting later today and transition to comfort care        Maria Eugenia Garcia MD  18/47/3968  **Certification      PHYSICIAN Certification of Need for Inpatient Hospitalization - Initial Certification    Patient will require inpatien

## 2019-12-23 NOTE — CONSULTS
[facility] Cardiology Consult Note    Marbin Lewis Patient Status:  Emergency    1925 MRN [id number]   Location [address] Attending Romeo Phillips MD   Hosp Day # 0 PCP Farhan Mayfield MD     80year old female Cardiology    --------------------------------------------------------------------------------------------------------------------------------  ROS 10 systems reviewed, pertinent findings above.   ROS    History:  Past Medical History:   Diagnosis Date   •

## 2019-12-23 NOTE — PROGRESS NOTES
[facility] Pharmacy Note:  Renal Dose Adjustment for Metoclopramide (REGLAN)    Pauline Montaño has been prescribed Metoclopramide (REGLAN) 10 mg every 6 hours as needed for n/v.    Estimated Creatinine Clearance: 28.3 mL/min (based on SCr of 0.75 mg/dL).     He

## 2019-12-23 NOTE — CM/SW NOTE
MSW assisted TNL  With informational meeting with pts sons who are all in agreement to move forward with [address] admission with [facility].

## 2019-12-23 NOTE — PROGRESS NOTES
S: Patient recently admitted to inpatient hospice and moved to [facility]  O: Patient was unable to communicate at time of visit. [facility] consult requests  for anointing. Residential case manager stated that pt.  Son said [facility] [place]  [facility]

## 2019-12-23 NOTE — DISCHARGE SUMMARY
[facility]    Discharge Summary    Jacquelyn Keller Patient Status:  Inpatient    1925 MRN [id number]   Location [facility] 1W Attending Keila Moody MD   Hosp Day # 0 PCP Zaid Dangelo MD     Date of Admission chronic hypoxemic respiratory failure  Acute diastolic heart failure next urinary tract infection    Physical Exam:   See h+p      History of Present Illness:     Janel Jefferson is a(n) 80year old female.   She has a past medical history of diastolic CH

## 2019-12-23 NOTE — TELEPHONE ENCOUNTER
Karly from [facility] called. Visited pt today, respirations 60. Ox was 74.  Karly called ambulance, pt was taken to [place] [facility]     Noted

## 2019-12-23 NOTE — CONSULTS
[facility] Patient Status:  Emergency    1925 MRN [id number]   Location [address] Attending Salima Eden MD   Hosp Day # 0 PCP Andrei Banks very foul smelling and painful with movements. Appetite has been poor, and she has lost 14 pounds over the past 5 months. + chronic dysphagia on modified diet at home. No abdominal pain, n/v and moved her bowels yesterday. No signs if agitation.  Pt is bed MOUTH AT BEDTIME AS NEEDED  ALPRAZolam 0.25 MG Oral Tab, Take 1 tablet (0.25 mg total) by mouth 2 (two) times daily as needed. Hold for drowsiness. furosemide 40 MG Oral Tab, Take 1 tablet (40 mg total) by mouth BID (Diuretic).   METOPROLOL SUCCINATE ER 25 Signs: Blood pressure 113/62, pulse 75, temperature 99.4 °F (37.4 °C), temperature source Temporal, resp. rate 22, SpO2 100 %. There is no height or weight on file to calculate BMI.   Present Level of pain: SPRING  Non-verbal signs of pain present: Yes-nicci patient's sons as above. I provided education on the differences between palliative care and hospice care.   I discussed the benefits of palliative care to include assistance with arising symptom management needs, extra layer of support, facilitate GOC/ACP details  -Confirmed wishes for DNR/DNI/no feeding tubes and prefers comfort focused tx  -[facility] to evaluate today-discussed with Mariela Cooley RN  -Comfort meds prn ordered and recommend transfer to [facility] for hospice nursing  -Agreeable to palliative c

## 2019-12-24 NOTE — H&P
[facility]     History & Physical           Ro Seayjose Patient Status:  Inpatient    1925 MRN [id number]   Location [facility] 1W Attending Jonah Chase MD   Hosp Day # 0 PCP Jelena Francisco MD      Date: Problem Relation Age of Onset   • Heart Disease Mother 72         CAD      Social History:  Social History    Tobacco Use      Smoking status: Never Smoker      Smokeless tobacco: Never Used    Alcohol use: No    Drug use:  No     Allergies/Medications:   A   WBC 9.5 12/23/2019     HGB 10.1 (L) 12/23/2019     HCT 32.9 (L) 12/23/2019     .0 12/23/2019     CREATSERUM 0.75 12/23/2019     BUN 36 (H) 12/23/2019      (L) 12/23/2019     K 4.9 12/23/2019      12/23/2019     CO2 32.0 12/23/2019     UTI  Atrial fibrillation  Severe protein calorie malnutrition with weight loss  Advanced dementia with agitation  RA  Chronic dysphasia        Disposition–inpatient hospice         Edson Garcia MD  31/75/4637  **Certification        PHYSICIAN Certificat

## 2019-12-24 NOTE — PLAN OF CARE
Problem: COPING  Goal: Pt/Family able to verbalize concerns and demonstrate effective coping strategies  Description  INTERVENTIONS:  - Assist patient/family to identify coping skills, available support systems and cultural and spiritual values  - Provid hinder letting go of issue  - Help patient explore feelings of anger, bitterness, resentment  - Help patient/family identify and examine the situation in which these feelings are experienced  - Help patient/family identify destructive displacement of feeli

## 2019-12-24 NOTE — SIGNIFICANT EVENT
Pt  at [facility]. Resusitation not attempted as pt was DNR. Time of Death 23:17    Family Notified Sophia Socorro (son)    MD Dr. Elton Lemon paged    [facility] [employer] Notified Pradip Vinayak #[phone].  Not a candidate     contacted if applicable n/a

## 2019-12-24 NOTE — ED PROVIDER NOTES
Patient Seen in: [facility]w/sw/se    History   Patient presents with:  Dyspnea MASOUD SOB    Stated Complaint: Acute cystitis with hematuria    HPI    Patient here with EMS for dec mental status and difficulty breathing. Hypoxic and SOB.   Patient Oral Tab,  Take 500 mg by mouth every 6 (six) hours as needed for Pain.        Family History   Problem Relation Age of Onset   • Heart Disease Mother 72        CAD       Social History    Tobacco Use      Smoking status: Never Smoker      Smokeless tobacco PANEL (8) - Abnormal; Notable for the following components:    Glucose 182 (*)     Sodium 135 (*)     BUN 36 (*)     BUN/CREA Ratio 48.0 (*)     Calcium, Total 8.2 (*)     All other components within normal limits   PRO BETA NATRIURETIC PEPTIDE - Abnormal; an inflammatory process. Moderate bibasilar airspace disease may be related to the edema although underlying pneumonia not excluded. Mild blunting of both costophrenic angles. Follow-up studies are advised.     Dictated by (CST): Khurram Avina MD on 12/

## 2019-12-24 NOTE — DISCHARGE SUMMARY
Signed                [facility] HOSP - [facility]     Discharge Summary           Shabana Young Patient Status:  Inpatient    1925 MRN [id number]   Location [facility] 1W Attending Shahzad Jacques MD   Hosp Day # 0 PCP [facility] Reason for Admission:   Acute on chronic hypoxemic respiratory failure  Acute diastolic heart failure next urinary tract infection     Physical Exam:   See h+p        History of Present Illness:      Candy Lombard Nowicki is a(n) 80year old female. Phil Aaron has a

## 2019-12-25 RX ORDER — METOPROLOL SUCCINATE 25 MG/1
TABLET, EXTENDED RELEASE ORAL
Qty: 30 TABLET | Refills: 0 | OUTPATIENT
Start: 2019-12-25

## 2019-12-26 ENCOUNTER — TELEPHONE (OUTPATIENT)
Dept: INTERNAL MEDICINE UNIT | Facility: HOSPITAL | Age: 84
End: 2019-12-26

## 2019-12-26 ENCOUNTER — TELEPHONE (OUTPATIENT)
Dept: INTERNAL MEDICINE CLINIC | Facility: CLINIC | Age: 84
End: 2019-12-26

## 2019-12-26 NOTE — TELEPHONE ENCOUNTER
Sympathy was extended to [place], and also to [facility] family for the passing of her mother Holly Alicea. We will miss her.

## 2019-12-26 NOTE — TELEPHONE ENCOUNTER
Death certificate completed and signed by Hospitalist MD Dr. Kaitlynn Anderson. Submitted via fax to [phone]. Confirmation fax received.

## 2024-10-28 NOTE — LETTER
Hospital Discharge Documentation    From: [address] Hospitalist's Office  Phone: [phone]    Patient discharged time/date: 2019  3:14 AM  Patient discharge disposition:   Hospice  See below for discharge summary for patient going into Acute on chronic diastolic (congestive) heart failure (HCC)     Benign essential HTN     Acidosis, metabolic     Extremity cyanosis     Hyponatremia     Goals of care, counseling/discussion     Palliative care encounter     Diabetes mellitus due to unde Severe protein calorie malnutrition with weight loss  Advanced dementia with agitation  RA  Chronic dysphasia        Disposition–plan inpatient hospice       Consultations:   Palliative care  Hospice  Cardiology     Procedures: n/a     Complications: n/a 5 (moderate pain)

## (undated) NOTE — LETTER
Hospital Discharge Documentation  Please phone to schedule a hospital follow up appointment.     From: [address] Hospitalist's Office  Phone: [phone]    Patient discharged time/date: 6/21/2019  1:06 PM  Patient discharge disposition:  [address]     normal; there were no regional wall motion abnormalities. 2.  Ventricular septum: The contour showed diastolic flattening and      systolic flattening. These changes are consistent with RV      volume and pressure overload. 3.  Aortic valve:  There is Osteoarthritis. 6. Chronic appearing wedging of multiple vertebra. Dictated by (CST): Tammy Sloan MD on 6/20/2019 at 8:18     Approved by (CST):  Tammy Sloan MD on 6/20/2019 at 8:23            LABS :     Lab Results   Component Value Date What changed:  Another medication with the same name was changed. Make sure you understand how and when to take each.       TAKE ONE TABLET BY MOUTH AT BEDTIME AS NEEDED   Quantity:  30 tablet  Refills:  0     furosemide 40 MG Tabs  Commonly known as:  ASMITA SHADY BARNETT  6/21/2019  10:40 AM[RZ.1]                        Electronically signed by Margie Apple MD on 6/21/2019 10:44 AM   Attribution Key     RZ. 1 - Margie Apple MD on 6/21/2019 10:40 AM   RZ. 2 - Margie Apple MD on 6/21/2019 10: Chronic kidney disease, stage 3  -Cr stable with diuresis  -check BMP Monday - results to cardiology     Hypertension  -monitor     Dementia  -TSH / B12 ok  -cont Aricept   -had bad night - 2/2 delirium / sundowning   -Geodon for sundowning   -scheduled xa BUN 21* 23* 28*   CREATSERUM 1.00 1.03* 1.13*   GFRAA 56* 54* 48*   GFRNAA 49* 47* 42*   CA 8.9 8.9 8.7    141 139   K 3.7 4.2 3.9    108 104   CO2 25.0 25.0 28.0     Lab Results   Component Value Date    INR 1.10 03/10/2015       Discharge Med These medications were sent to [facility] [address] [facility] [phone], [phone]  [address], [address]    Phone:  [phone]   · furosemide 40 MG Tabs         Follow up Visits  Kavita Jama MD  90

## (undated) NOTE — MR AVS SNAPSHOT
[facility] [address]  [phone]               Thank you for choosing us for your health care visit with Bobie Olszewski, APN.   We are glad to serve you and happy to provide you with this summary of your Coughing up mucus that is clear, yellow, or green  People with bronchitis do not usually get a fever.   When should I call the doctor or nurse? — Most people who have a cough that lasts longer than their other cold or flu symptoms do not need to see a docto BP Pulse Temp Height Weight BMI    118/78 mmHg 83 98 °F (36.7 °C) (Oral) 62\" 110 lb 20.11 kg/m2         Current Medications          This list is accurate as of: 4/25/17 11:34 AM.  Always use your most recent med list.                Albuterol Sulfate HF - Albuterol Sulfate  (90 Base) MCG/ACT Aers            MyChart     Sign up for [email or web address], your secure online medical record. [employer] will allow you to access patient instructions from your recent visit,  view other health information, and more.  To s

## (undated) NOTE — MR AVS SNAPSHOT
[facility] at [address] [place] [address] Road [address] [phone]               Thank you for choosing us for your health care visit with Gumaro Leong MD.  We are glad to serve you and happy to Take 81 mg by mouth daily. cephALEXin 250 MG Caps   Take 1 capsule (250 mg total) by mouth 3 (three) times daily. Commonly known as:  KEFLEX           Donepezil HCl 5 MG Tabs   Take 1 tablet (5 mg total) by mouth every other day.  Take this medi Bring a paper prescription for each of these medications    - Donepezil HCl 5 MG Tabs            Immunizations Administered in the Office Today     TD       Josue     Sign up for [email or web address], your secure online medical record.   [email or web address] will allow you to acc ? Make sure carpets and area rugs have skid proof backing. ? Do not use slippery wax on bare floors. ? Keep furniture in its accustomed place. ? If you have pets, be careful that you don’t trip over them. OUTSIDE SAFETY TIPS  ?  Always wear good shoe

## (undated) NOTE — IP AVS SNAPSHOT
[address]            (For Outpatient Use Only) Initial Admit Date: 10/4/2019   Inpt/Obs Admit Date: Inpt: 10/5/19 / Obs: 10/04/19   Discharge Date:    Aldo Becker:  [de-identified]   MRN: [de-identified]   CSN: [id number]   CEID: [id number] Subscriber ID:  Pt Rel to Subscriber:    Hospital Account Financial Class: Medicare    October 10, 2019

## (undated) NOTE — MR AVS SNAPSHOT
[facility] at [address] [place] [address] Road [address] [phone]               Thank you for choosing us for your health care visit with Martha Argueta MD.  We are glad to serve you and happy to 1/2 spironolactone on Tuesdays and Sundays  Take 1/2 instead of  1 Lasix  On Wednesday.         Allergies as of Mar 16, 2017     No Known Allergies                Today's Vital Signs     BP Pulse Height Weight BMI    124/70 mmHg 81 63\" 112 lb 19.84 kg/m2 - spironolactone 25 MG Tabs            Results of Recent Testing     TSH W REFLEX TO FREE T4      Component Value Standard Range & Units    TSH 1.35 0.34-5.60 uIU/mL                VITAMIN B12      Component Value Standard Range & Units    Vitamin B12 397 active are less likely to develop some chronic diseases than adults who are inactive.      HOW TO GET STARTED: HOW TO STAY MOTIVATED:   Start activities slowly and build up over time Do what you like   Get your heart pumping – brisk walking, biking, swimmin

## (undated) NOTE — LETTER
Hospital Discharge Documentation  Please phone to schedule a hospital follow up appointment.     From: [address] Hospitalist's Office  Phone: [phone]    Patient discharged time/date: 2/14/2019  2:11 PM  Patient discharge disposition:  Home or Self disorientation at home, some agitation. Caregiver states that this was not different during her hospitalization. Patient was not septic. Not bacteremic. She will be treated for an uncomplicated urinary tract infection.   She may benefit from low-dose [place] taking this medication, and follow the directions you see here. Take 1 tablet (0.25 mg total) by mouth 2 (two) times daily as needed for Anxiety.    Quantity:  90 tablet  Refills:  0     Donepezil HCl 5 MG Tabs  Commonly known as:  ARICEPT  What change Greater than 30 minutes spent on discharge.  -----------------------    Hospital Discharge Diagnoses: Acute metabolic encephalopathy    Lace+ Score: 70  59-90 High Risk  29-58 Medium Risk  0-28   Low Risk. TCM Follow-Up Recommendation:  LACE > 58:  High

## (undated) NOTE — IP AVS SNAPSHOT
Patient Demographics     Address  [address] [address] Phone  [phone] [facility])  [phone] (Mobile) *Preferred*      Emergency Contact(s)     Name Relation Home Work [address] Son [phone] [address], functioning and strong. Unless instructed not to exercise, you may walk at a slow to moderate pace for 10-15 minutes 2-3 days per week to start. Pace your activity to prevent shortness of breath or fatigue.  Stop exercise if you develop chest pain, lighthea Commonly known as:  XANAX      Take 1 tablet (0.5 mg total) by mouth nightly as needed. Maria G Ortiz, NP         aspirin EC 81 MG Tbec      Take 81 mg by mouth daily.           furosemide 40 MG Tabs  Commonly known as:  LASIX      Take 1 tablet (40 mg [id number] multivitamin (ADULT) tab 1 tablet 10/10/19 0810 Given      [id number] potassium chloride (KLOR-CON) powder packet 40 mEq 10/09/19 1708 Given      [id number] spironolactone (ALDACTONE) tab 12.5 mg 10/10/19 0810 Given            LEFT DELTOID     Lamount Antes Urine Culture, Routine Once [[id number]]  (Abnormal)  (Susceptibility) Collected:  10/04/19 1549    Order Status:  Completed Lab Status:  Final result Updated:  10/06/19 0707    Specimen:  Urine, clean catch      Urine Culture >100,000 CFU/ML Klebsiella pn discoloration of her lower extremities and her fingertips. She has cool extremities. Work-up  is consistent with acute heart failure. Bedside echocardiogram done by cardiology does not show severely depressed ejection fraction.     Past Medical History: afternoon Disp: 90 tablet Rfl: 0   acetaminophen 500 MG Oral Tab Take 500 mg by mouth every 6 (six) hours as needed for Pain. Disp:  Rfl:        Review of Systems:   A comprehensive 14 point review of systems was completed.     Pertinent positives and negat 7. Hyponatremia  1. Monitor with diuresis  2. Fluid restriction  8. Metabolic TXVVRWPJ[DF.8]  9. Anxiety[FA. 2]    Dispo: diurese as tolerated. Pt with low perfusion/low output heart failure.   Likely will need palliative care     Quality:  · DVT Prophylaxi echocardiogram done by cardiology does not show severely depressed ejection fraction.     Past Medical History:  Past Medical History:   Diagnosis Date   • Anxiety state, unspecified    • Arrhythmia     A fib   • Congestive heart disease ([facility]    • Dementia Review of Systems:   A comprehensive 14 point review of systems was completed. Pertinent positives and negatives noted in the HPI.     Physical Exam:    /74   Pulse 52   Temp (!) 96.2 °F (35.7 °C) (Temporal)   Resp 23   Ht 5' 4\" (1.626 m)   Wt 135 Dispo: diurese as tolerated. Pt with low perfusion/low output heart failure.   Likely will need palliative care     Quality:  · DVT Prophylaxis: heparin  · CODE status: DNR  · Ordoñez: no    Plan of care discussed with ED physician    Charlette Van MD  10/4     volume and pressure overload. 3.  Aortic valve: There is sclerosis without stenosis. Mild      regurgitation. 4.  Mitral valve: Severe regurgitation. 5.  Left atrium: The atrium was severely dilated. 6.  Right ventricle:  The cavity size was dilated /74   Pulse 52   Temp (!) 96.2 °F (35.7 °C) (Temporal)   Resp 23   Ht 5' 4\" (1.626 m)   Wt 135 lb (61.2 kg)   SpO2 100%   BMI 23.17 kg/m²   Temp (24hrs), Av.2 °F (35.7 °C), Min:96.2 °F (35.7 °C), Max:96.2 °F (35.7 °C)     No intake or output anders Brian Ellsworth MD  10/4/2019  4:26 PM[VM.1]      Electronically signed by Gertrudis España MD on 10/4/2019  6:43 PM   Attribution Key    VM. 1 - Gertrudis España MD on 10/4/2019  4:26 PM  VM. 2 - Gertrudis España MD on 10/4/2019  6:27 PM • Osteoarthrosis, unspecified whether generalized or localized, unspecified site    • Rheumatoid arthritis [facility])    • Spinal stenosis    • Spinal stenosis in cervical region    • Visual impairment        Past Surgical History  Past Surgical History:   Proc Skilled Therapy Provided: Pt received sitting up in bedside chair and is agreeable to some therapy. Pt participatory in [place]. Pt requires maximal encouragement to participate in unsupported sitting.  Pt assisted with initiation of anterior weightshift education;Gait training;Strengthening;Transfer training;Balance training  Rehab Potential : Guarded  Frequency (Obs): 5x/week    CURRENT GOALS     Goal #1 Patient is able to demonstrate supine - sit EOB @ level: max A      Goal #2 Pt will sit unsupported x Acute on chronic diastolic (congestive) heart failure (HCC)  Active Problems:    Benign essential HTN    Acidosis, metabolic    Extremity cyanosis    Hyponatremia    Goals of care, counseling/discussion    Palliative care encounter      Past Medical Hist Premature Spillage to: Valleculae  Delay (seconds): 1-2 seconds  Residue Severity, Location: Mild/Mod;Valleculae  Cleared/Reduced with: Secondary swallow; Attempted however ineffective  Laryngeal Penetration: Before the swallow  Tracheal Aspiration: Before Patient presents with a mild-moderate oral phase and moderate pharyngeal phase impairment of the swallow with deep laryngeal penetration and tracheal aspiration without cough or throat clear in response with nectar thick liquids.      Oral phase revealed p Agreement/Understanding verbalized and all questions answered to their apparent satisfaction. INTERDISCIPLINARY COMMUNICATION  Reviewed results with Radiologist; agreement verbalized.     Patient Experiencing Pain: No       FOLLOW UP  Treatment Plan/Melvin Goals of care, counseling/discussion    Palliative care encounter      Past Medical History  Past Medical History:   Diagnosis Date   • Anxiety state, unspecified    • Arrhythmia     A fib   • Congestive heart disease ([facility] [address]    • Dementia ([facility]    • Depre Cleared/Reduced with: Secondary swallow; Attempted however ineffective  Laryngeal Penetration: Before the swallow  Tracheal Aspiration: Before the swallow;SILENT  Cough/Throat Clear Response: No  Cough/Throat Clear Effective: No  Strategy(ies) Implemented ( tracheal aspiration without cough or throat clear in response with nectar thick liquids.      Oral phase revealed posterior escape of nectar thick liquids with spillage to the valleculae and slow mastication of hard solids.   Pharyngeal phase revealed swal Treatment Plan/Recommendations: Aspiration precautions(meal monitor)  Number of Visits to Meet Established Goals: 1-2      Thank you for your referral.   If you have any questions, please contact Maru Lopez[CJ.1]    ROJAS Duke   Attribution Aspiration Precautions: Slow rate;Upright position;Small bites and sips; No straw  Medication Administration Recommendations: One pill at a time; Whole in puree    Patient Experiencing Pain: No                Discharge Recommendations  Discharge Recommendati

## (undated) NOTE — LETTER
Hospital Discharge Documentation  Pt  during her las admission at [facility] under inpatient [address]. Hospitalist MD to sign death certificate. Below is the Discharge Summary for your review.     From: [address] Hospitalist's Office Chronic bilateral pleural effusions     Benign neoplasm of choroid     Diplopia     Nonexudative senile macular degeneration of retina     Acute cystitis with hematuria     Delirium, acute     Acute kidney injury ([address]     Acute on chronic diastolic (co plan is to transition to inpatient hospice we will plan for Lasix and Robinul to help with congestion  We will start a morphine drip  Agree with transition to hospice     UTI  Atrial fibrillation  Severe protein calorie malnutrition with weight loss  Advan